# Patient Record
Sex: MALE | Race: OTHER | NOT HISPANIC OR LATINO | URBAN - METROPOLITAN AREA
[De-identification: names, ages, dates, MRNs, and addresses within clinical notes are randomized per-mention and may not be internally consistent; named-entity substitution may affect disease eponyms.]

---

## 2018-03-26 ENCOUNTER — EMERGENCY (EMERGENCY)
Facility: HOSPITAL | Age: 53
LOS: 0 days | Discharge: HOME | End: 2018-03-26
Attending: EMERGENCY MEDICINE
Payer: MEDICAID

## 2018-03-26 VITALS
HEART RATE: 75 BPM | TEMPERATURE: 98 F | RESPIRATION RATE: 17 BRPM | HEIGHT: 68 IN | SYSTOLIC BLOOD PRESSURE: 151 MMHG | OXYGEN SATURATION: 100 % | WEIGHT: 160.06 LBS | DIASTOLIC BLOOD PRESSURE: 79 MMHG

## 2018-03-26 DIAGNOSIS — M54.32 SCIATICA, LEFT SIDE: ICD-10-CM

## 2018-03-26 DIAGNOSIS — M54.5 LOW BACK PAIN: ICD-10-CM

## 2018-03-26 PROCEDURE — 99283 EMERGENCY DEPT VISIT LOW MDM: CPT

## 2018-03-26 RX ORDER — KETOROLAC TROMETHAMINE 30 MG/ML
30 SYRINGE (ML) INJECTION ONCE
Qty: 0 | Refills: 0 | Status: DISCONTINUED | OUTPATIENT
Start: 2018-03-26 | End: 2018-03-26

## 2018-03-26 RX ORDER — DIAZEPAM 5 MG
1 TABLET ORAL
Qty: 9 | Refills: 0 | OUTPATIENT
Start: 2018-03-26 | End: 2018-03-28

## 2018-03-26 RX ORDER — DIAZEPAM 5 MG
5 TABLET ORAL ONCE
Qty: 0 | Refills: 0 | Status: DISCONTINUED | OUTPATIENT
Start: 2018-03-26 | End: 2018-03-26

## 2018-03-26 RX ADMIN — Medication 30 MILLIGRAM(S): at 16:36

## 2018-03-26 RX ADMIN — Medication 50 MILLIGRAM(S): at 16:37

## 2018-03-26 RX ADMIN — Medication 5 MILLIGRAM(S): at 16:37
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
## 2018-03-26 NOTE — ED PROVIDER NOTE - MEDICAL DECISION MAKING DETAILS
history/physical consistent with sciatic pain without red flags. triage note states pain in both legs, pt denies any pain in right leg, repeatedly says only left leg. no trauma, not acute, no sign of spinal cord compression. could be from herniated disc vs muscle spasm. toradol, valium, short course steroid, pcp f/u for outpt mri and/or PT if not improving.

## 2018-03-26 NOTE — ED PROVIDER NOTE - PROGRESS NOTE DETAILS
Bebeto: pt with good improvement of pain. ambulatory, given scripts for prednisone, naprosyn and valium, instructions and precautions. stable for d/c.

## 2018-03-26 NOTE — ED PROVIDER NOTE - PHYSICAL EXAMINATION
Gen: mild discomfort, non toxic, sitting up in chair  HEENT: Mucous membranes moist, pink conjunctivae, EOMI  CV: RRR, nl s1/s2.  Resp: CTAB, normal rate and effort  GI: Abdomen soft, NT, ND. No rebound, no guarding  : No CVAT  Neuro: A&O x 3, moving all 4 extremities. 2+ reflexes b/l knees and ankles.   MSK: no focal midline spinal tenderness, mild generalized tenderness left lower back. no swelling/focal tenderness in LLE.   Skin: No rashes. intact and perfused.
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
## 2018-09-22 ENCOUNTER — INPATIENT (INPATIENT)
Facility: HOSPITAL | Age: 53
LOS: 1 days | Discharge: HOME | End: 2018-09-24
Attending: INTERNAL MEDICINE | Admitting: INTERNAL MEDICINE
Payer: MEDICAID

## 2018-09-22 VITALS
TEMPERATURE: 98 F | SYSTOLIC BLOOD PRESSURE: 136 MMHG | HEART RATE: 81 BPM | RESPIRATION RATE: 16 BRPM | OXYGEN SATURATION: 100 % | DIASTOLIC BLOOD PRESSURE: 91 MMHG

## 2018-09-22 PROBLEM — M54.9 DORSALGIA, UNSPECIFIED: Chronic | Status: ACTIVE | Noted: 2018-03-26

## 2018-09-22 LAB
ALBUMIN SERPL ELPH-MCNC: 4.1 G/DL — SIGNIFICANT CHANGE UP (ref 3.3–5)
ALP SERPL-CCNC: 82 U/L — SIGNIFICANT CHANGE UP (ref 40–120)
ALT FLD-CCNC: 20 U/L — SIGNIFICANT CHANGE UP (ref 4–41)
AST SERPL-CCNC: 23 U/L — SIGNIFICANT CHANGE UP (ref 4–40)
BASOPHILS # BLD AUTO: 0.08 K/UL — SIGNIFICANT CHANGE UP (ref 0–0.2)
BASOPHILS NFR BLD AUTO: 0.4 % — SIGNIFICANT CHANGE UP (ref 0–2)
BILIRUB SERPL-MCNC: 1.9 MG/DL — HIGH (ref 0.2–1.2)
BUN SERPL-MCNC: 18 MG/DL — SIGNIFICANT CHANGE UP (ref 7–23)
CALCIUM SERPL-MCNC: 9.1 MG/DL — SIGNIFICANT CHANGE UP (ref 8.4–10.5)
CHLORIDE SERPL-SCNC: 101 MMOL/L — SIGNIFICANT CHANGE UP (ref 98–107)
CO2 SERPL-SCNC: 23 MMOL/L — SIGNIFICANT CHANGE UP (ref 22–31)
CREAT SERPL-MCNC: 0.97 MG/DL — SIGNIFICANT CHANGE UP (ref 0.5–1.3)
EOSINOPHIL # BLD AUTO: 0.07 K/UL — SIGNIFICANT CHANGE UP (ref 0–0.5)
EOSINOPHIL NFR BLD AUTO: 0.4 % — SIGNIFICANT CHANGE UP (ref 0–6)
GLUCOSE SERPL-MCNC: 91 MG/DL — SIGNIFICANT CHANGE UP (ref 70–99)
HCT VFR BLD CALC: 39.8 % — SIGNIFICANT CHANGE UP (ref 39–50)
HGB BLD-MCNC: 13.5 G/DL — SIGNIFICANT CHANGE UP (ref 13–17)
IMM GRANULOCYTES # BLD AUTO: 0.16 # — SIGNIFICANT CHANGE UP
IMM GRANULOCYTES NFR BLD AUTO: 0.9 % — SIGNIFICANT CHANGE UP (ref 0–1.5)
LYMPHOCYTES # BLD AUTO: 1.8 K/UL — SIGNIFICANT CHANGE UP (ref 1–3.3)
LYMPHOCYTES # BLD AUTO: 10.1 % — LOW (ref 13–44)
MCHC RBC-ENTMCNC: 29.6 PG — SIGNIFICANT CHANGE UP (ref 27–34)
MCHC RBC-ENTMCNC: 33.9 % — SIGNIFICANT CHANGE UP (ref 32–36)
MCV RBC AUTO: 87.3 FL — SIGNIFICANT CHANGE UP (ref 80–100)
MONOCYTES # BLD AUTO: 1.47 K/UL — HIGH (ref 0–0.9)
MONOCYTES NFR BLD AUTO: 8.2 % — SIGNIFICANT CHANGE UP (ref 2–14)
NEUTROPHILS # BLD AUTO: 14.29 K/UL — HIGH (ref 1.8–7.4)
NEUTROPHILS NFR BLD AUTO: 80 % — HIGH (ref 43–77)
NRBC # FLD: 0 — SIGNIFICANT CHANGE UP
PLATELET # BLD AUTO: 202 K/UL — SIGNIFICANT CHANGE UP (ref 150–400)
PMV BLD: 10.7 FL — SIGNIFICANT CHANGE UP (ref 7–13)
POTASSIUM SERPL-MCNC: 3.9 MMOL/L — SIGNIFICANT CHANGE UP (ref 3.5–5.3)
POTASSIUM SERPL-SCNC: 3.9 MMOL/L — SIGNIFICANT CHANGE UP (ref 3.5–5.3)
PROT SERPL-MCNC: 7.1 G/DL — SIGNIFICANT CHANGE UP (ref 6–8.3)
RBC # BLD: 4.56 M/UL — SIGNIFICANT CHANGE UP (ref 4.2–5.8)
RBC # FLD: 12 % — SIGNIFICANT CHANGE UP (ref 10.3–14.5)
SODIUM SERPL-SCNC: 138 MMOL/L — SIGNIFICANT CHANGE UP (ref 135–145)
WBC # BLD: 17.87 K/UL — HIGH (ref 3.8–10.5)
WBC # FLD AUTO: 17.87 K/UL — HIGH (ref 3.8–10.5)

## 2018-09-22 PROCEDURE — 93971 EXTREMITY STUDY: CPT | Mod: 26,LT

## 2018-09-22 RX ORDER — KETOROLAC TROMETHAMINE 30 MG/ML
30 SYRINGE (ML) INJECTION ONCE
Qty: 0 | Refills: 0 | Status: DISCONTINUED | OUTPATIENT
Start: 2018-09-22 | End: 2018-09-22

## 2018-09-22 RX ORDER — OXYCODONE AND ACETAMINOPHEN 5; 325 MG/1; MG/1
1 TABLET ORAL ONCE
Qty: 0 | Refills: 0 | Status: DISCONTINUED | OUTPATIENT
Start: 2018-09-22 | End: 2018-09-22

## 2018-09-22 RX ORDER — SODIUM CHLORIDE 9 MG/ML
1000 INJECTION INTRAMUSCULAR; INTRAVENOUS; SUBCUTANEOUS ONCE
Qty: 0 | Refills: 0 | Status: COMPLETED | OUTPATIENT
Start: 2018-09-22 | End: 2018-09-22

## 2018-09-22 RX ADMIN — OXYCODONE AND ACETAMINOPHEN 1 TABLET(S): 5; 325 TABLET ORAL at 21:25

## 2018-09-22 RX ADMIN — SODIUM CHLORIDE 1000 MILLILITER(S): 9 INJECTION INTRAMUSCULAR; INTRAVENOUS; SUBCUTANEOUS at 22:45

## 2018-09-22 RX ADMIN — OXYCODONE AND ACETAMINOPHEN 1 TABLET(S): 5; 325 TABLET ORAL at 22:00

## 2018-09-22 RX ADMIN — Medication 30 MILLIGRAM(S): at 17:03

## 2018-09-22 RX ADMIN — OXYCODONE AND ACETAMINOPHEN 1 TABLET(S): 5; 325 TABLET ORAL at 23:20

## 2018-09-22 RX ADMIN — SODIUM CHLORIDE 1000 MILLILITER(S): 9 INJECTION INTRAMUSCULAR; INTRAVENOUS; SUBCUTANEOUS at 23:55

## 2018-09-22 RX ADMIN — Medication 100 MILLIGRAM(S): at 17:02

## 2018-09-22 RX ADMIN — OXYCODONE AND ACETAMINOPHEN 1 TABLET(S): 5; 325 TABLET ORAL at 23:55

## 2018-09-22 NOTE — ED PROVIDER NOTE - OBJECTIVE STATEMENT
52 y/o male pmh eczema c/o RLE pain ,redness and swelling x4 days. Pt admits to noticing small area of redness around old skin irritation. Pt states that the area has become increasingly painful and more red. Pt now c/o pain radiating up to R groin. Pt admits to subjective fever at home. Denies chest pain, sob, abd pain, n/b/d, numbness, tingling, weakness, dizziness, syncope or chills. Pt denies recent long travel or immobilization.

## 2018-09-22 NOTE — ED CDU PROVIDER INITIAL DAY NOTE - SEPSIS ALERT QUESTION 1
This patient was evaluated for sepsis.  At this time, a diagnosis of sepsis is not supported by the overall clinical picture.

## 2018-09-22 NOTE — ED CDU PROVIDER INITIAL DAY NOTE - OBJECTIVE STATEMENT
54 y/o male pmh eczema c/o RLE pain ,redness and swelling x4 days. Pt admits to noticing small area of redness around old skin irritation. Pt states that the area has become increasingly painful and more red. Pt now c/o pain radiating up to R groin. Pt admits to subjective fever at home. Denies chest pain, sob, abd pain, n/b/d, numbness, tingling, weakness, dizziness, syncope or chills. Pt denies recent long travel or immobilization.    CDU TIEN Marie: 54 yo M with PMHX of eczema presented to ED with RLE x 4 days, worsening in pain prompting is ED visit. Tactile/ subjective fever at home (afebrile in ED). Pain radiates up leg to the groin. Denies cp ,sob, numbness, tingling, weakness, abd pain, nausea, vomiting, urinary complaints. Pt placed in CDU for IV abx, and general observation for cellulitis.

## 2018-09-22 NOTE — ED ADULT TRIAGE NOTE - CHIEF COMPLAINT QUOTE
Pt c/o redness/swelling/pain to shin x 3 days, area warm to touch, denies trauma to area/recent long travel.

## 2018-09-22 NOTE — ED CDU PROVIDER INITIAL DAY NOTE - ATTENDING CONTRIBUTION TO CARE
I performed a face to face bedside interview with patient regarding history of present illness, review of symptoms and past medical history. I completed an independent physical exam.  I have discussed patient's plan of care.   I agree with note as stated above, having amended the EMR as needed to reflect my findings. I have discussed the assessment and plan of care.  This includes during the time I functioned as the attending physician for this patient.  Attending Contribution to Care: agree with plan of pa. pt p/w iv abx s/p cellulitis around eczema site. pt hd stable. labs are wnl. stable for observation

## 2018-09-22 NOTE — ED PROVIDER NOTE - PHYSICAL EXAMINATION
RLE- 2 areas with superficial abrasions w/ surrounding erythema, TTP and minimal fluctuance. + proximal streaking. + calf tenderness, no pitting edema. 2 + pulses, distal sensation intact

## 2018-09-22 NOTE — ED CDU PROVIDER INITIAL DAY NOTE - LOCATION
anterior right shin with 2 areas of superficial abrasions with surrounding erythema, and TTP. mild fluctuance to superior area.NVI, 5/5 strength./leg

## 2018-09-22 NOTE — ED PROVIDER NOTE - MEDICAL DECISION MAKING DETAILS
54 y/o male w/ RLE pain, redness and swelling- likely cellulitis but will check doppler, labs, abx, cdu

## 2018-09-22 NOTE — ED ADULT NURSE NOTE - OBJECTIVE STATEMENT
Patient presented to ED A&O x4, with PMH: eczema c/o RLE pain ,redness and swelling x 4 days. Blood work drawn and sent to lab. ER physician evaluated patient, pain and IV antibiotics ordered and administered .  Will continue to monitor patient closely. Andrea BACK

## 2018-09-23 ENCOUNTER — TRANSCRIPTION ENCOUNTER (OUTPATIENT)
Age: 53
End: 2018-09-23

## 2018-09-23 DIAGNOSIS — Z29.9 ENCOUNTER FOR PROPHYLACTIC MEASURES, UNSPECIFIED: ICD-10-CM

## 2018-09-23 DIAGNOSIS — L03.90 CELLULITIS, UNSPECIFIED: ICD-10-CM

## 2018-09-23 DIAGNOSIS — R63.8 OTHER SYMPTOMS AND SIGNS CONCERNING FOOD AND FLUID INTAKE: ICD-10-CM

## 2018-09-23 DIAGNOSIS — M54.9 DORSALGIA, UNSPECIFIED: ICD-10-CM

## 2018-09-23 DIAGNOSIS — K46.9 UNSPECIFIED ABDOMINAL HERNIA WITHOUT OBSTRUCTION OR GANGRENE: Chronic | ICD-10-CM

## 2018-09-23 LAB
BASOPHILS # BLD AUTO: 0.09 K/UL — SIGNIFICANT CHANGE UP (ref 0–0.2)
BASOPHILS NFR BLD AUTO: 0.5 % — SIGNIFICANT CHANGE UP (ref 0–2)
EOSINOPHIL # BLD AUTO: 0.29 K/UL — SIGNIFICANT CHANGE UP (ref 0–0.5)
EOSINOPHIL NFR BLD AUTO: 1.7 % — SIGNIFICANT CHANGE UP (ref 0–6)
HBA1C BLD-MCNC: 5.7 % — HIGH (ref 4–5.6)
HCT VFR BLD CALC: 38 % — LOW (ref 39–50)
HGB BLD-MCNC: 12.8 G/DL — LOW (ref 13–17)
IMM GRANULOCYTES # BLD AUTO: 0.08 # — SIGNIFICANT CHANGE UP
IMM GRANULOCYTES NFR BLD AUTO: 0.5 % — SIGNIFICANT CHANGE UP (ref 0–1.5)
LYMPHOCYTES # BLD AUTO: 1.98 K/UL — SIGNIFICANT CHANGE UP (ref 1–3.3)
LYMPHOCYTES # BLD AUTO: 11.7 % — LOW (ref 13–44)
MCHC RBC-ENTMCNC: 29.6 PG — SIGNIFICANT CHANGE UP (ref 27–34)
MCHC RBC-ENTMCNC: 33.7 % — SIGNIFICANT CHANGE UP (ref 32–36)
MCV RBC AUTO: 88 FL — SIGNIFICANT CHANGE UP (ref 80–100)
MONOCYTES # BLD AUTO: 1.13 K/UL — HIGH (ref 0–0.9)
MONOCYTES NFR BLD AUTO: 6.7 % — SIGNIFICANT CHANGE UP (ref 2–14)
NEUTROPHILS # BLD AUTO: 13.31 K/UL — HIGH (ref 1.8–7.4)
NEUTROPHILS NFR BLD AUTO: 78.9 % — HIGH (ref 43–77)
NRBC # FLD: 0 — SIGNIFICANT CHANGE UP
PLATELET # BLD AUTO: 196 K/UL — SIGNIFICANT CHANGE UP (ref 150–400)
PMV BLD: 10.6 FL — SIGNIFICANT CHANGE UP (ref 7–13)
RBC # BLD: 4.32 M/UL — SIGNIFICANT CHANGE UP (ref 4.2–5.8)
RBC # FLD: 12.3 % — SIGNIFICANT CHANGE UP (ref 10.3–14.5)
WBC # BLD: 16.88 K/UL — HIGH (ref 3.8–10.5)
WBC # FLD AUTO: 16.88 K/UL — HIGH (ref 3.8–10.5)

## 2018-09-23 PROCEDURE — 73590 X-RAY EXAM OF LOWER LEG: CPT | Mod: 26,RT

## 2018-09-23 PROCEDURE — 99223 1ST HOSP IP/OBS HIGH 75: CPT | Mod: GC

## 2018-09-23 RX ORDER — INFLUENZA VIRUS VACCINE 15; 15; 15; 15 UG/.5ML; UG/.5ML; UG/.5ML; UG/.5ML
0.5 SUSPENSION INTRAMUSCULAR ONCE
Qty: 0 | Refills: 0 | Status: COMPLETED | OUTPATIENT
Start: 2018-09-23 | End: 2018-09-24

## 2018-09-23 RX ORDER — PIPERACILLIN AND TAZOBACTAM 4; .5 G/20ML; G/20ML
3.38 INJECTION, POWDER, LYOPHILIZED, FOR SOLUTION INTRAVENOUS EVERY 8 HOURS
Qty: 0 | Refills: 0 | Status: DISCONTINUED | OUTPATIENT
Start: 2018-09-23 | End: 2018-09-23

## 2018-09-23 RX ORDER — DICLOFENAC SODIUM 75 MG/1
0 TABLET, DELAYED RELEASE ORAL
Qty: 0 | Refills: 0 | COMMUNITY

## 2018-09-23 RX ORDER — VANCOMYCIN HCL 1 G
1000 VIAL (EA) INTRAVENOUS EVERY 12 HOURS
Qty: 0 | Refills: 0 | Status: DISCONTINUED | OUTPATIENT
Start: 2018-09-23 | End: 2018-09-24

## 2018-09-23 RX ORDER — DIPHENHYDRAMINE HCL 50 MG
25 CAPSULE ORAL ONCE
Qty: 0 | Refills: 0 | Status: COMPLETED | OUTPATIENT
Start: 2018-09-23 | End: 2018-09-23

## 2018-09-23 RX ORDER — PIPERACILLIN AND TAZOBACTAM 4; .5 G/20ML; G/20ML
3.38 INJECTION, POWDER, LYOPHILIZED, FOR SOLUTION INTRAVENOUS ONCE
Qty: 0 | Refills: 0 | Status: COMPLETED | OUTPATIENT
Start: 2018-09-23 | End: 2018-09-23

## 2018-09-23 RX ORDER — ACETAMINOPHEN 500 MG
650 TABLET ORAL EVERY 6 HOURS
Qty: 0 | Refills: 0 | Status: DISCONTINUED | OUTPATIENT
Start: 2018-09-23 | End: 2018-09-24

## 2018-09-23 RX ORDER — KETOROLAC TROMETHAMINE 30 MG/ML
15 SYRINGE (ML) INJECTION ONCE
Qty: 0 | Refills: 0 | Status: DISCONTINUED | OUTPATIENT
Start: 2018-09-23 | End: 2018-09-23

## 2018-09-23 RX ORDER — VANCOMYCIN HCL 1 G
1000 VIAL (EA) INTRAVENOUS ONCE
Qty: 0 | Refills: 0 | Status: COMPLETED | OUTPATIENT
Start: 2018-09-23 | End: 2018-09-23

## 2018-09-23 RX ORDER — OXYCODONE AND ACETAMINOPHEN 5; 325 MG/1; MG/1
1 TABLET ORAL EVERY 4 HOURS
Qty: 0 | Refills: 0 | Status: DISCONTINUED | OUTPATIENT
Start: 2018-09-23 | End: 2018-09-24

## 2018-09-23 RX ORDER — CYCLOBENZAPRINE HYDROCHLORIDE 10 MG/1
0 TABLET, FILM COATED ORAL
Qty: 0 | Refills: 0 | COMMUNITY

## 2018-09-23 RX ORDER — FAMOTIDINE 10 MG/ML
20 INJECTION INTRAVENOUS DAILY
Qty: 0 | Refills: 0 | Status: DISCONTINUED | OUTPATIENT
Start: 2018-09-23 | End: 2018-09-24

## 2018-09-23 RX ORDER — MORPHINE SULFATE 50 MG/1
4 CAPSULE, EXTENDED RELEASE ORAL ONCE
Qty: 0 | Refills: 0 | Status: DISCONTINUED | OUTPATIENT
Start: 2018-09-23 | End: 2018-09-23

## 2018-09-23 RX ADMIN — Medication 250 MILLIGRAM(S): at 22:08

## 2018-09-23 RX ADMIN — Medication 650 MILLIGRAM(S): at 21:35

## 2018-09-23 RX ADMIN — Medication 250 MILLIGRAM(S): at 10:30

## 2018-09-23 RX ADMIN — Medication 600 MILLIGRAM(S): at 10:15

## 2018-09-23 RX ADMIN — OXYCODONE AND ACETAMINOPHEN 1 TABLET(S): 5; 325 TABLET ORAL at 18:15

## 2018-09-23 RX ADMIN — OXYCODONE AND ACETAMINOPHEN 1 TABLET(S): 5; 325 TABLET ORAL at 22:15

## 2018-09-23 RX ADMIN — Medication 100 MILLIGRAM(S): at 09:22

## 2018-09-23 RX ADMIN — Medication 15 MILLIGRAM(S): at 10:00

## 2018-09-23 RX ADMIN — Medication 100 MILLIGRAM(S): at 01:29

## 2018-09-23 RX ADMIN — OXYCODONE AND ACETAMINOPHEN 1 TABLET(S): 5; 325 TABLET ORAL at 23:15

## 2018-09-23 RX ADMIN — Medication 15 MILLIGRAM(S): at 10:15

## 2018-09-23 RX ADMIN — PIPERACILLIN AND TAZOBACTAM 200 GRAM(S): 4; .5 INJECTION, POWDER, LYOPHILIZED, FOR SOLUTION INTRAVENOUS at 14:30

## 2018-09-23 RX ADMIN — MORPHINE SULFATE 4 MILLIGRAM(S): 50 CAPSULE, EXTENDED RELEASE ORAL at 11:00

## 2018-09-23 RX ADMIN — Medication 650 MILLIGRAM(S): at 20:35

## 2018-09-23 RX ADMIN — Medication 25 MILLIGRAM(S): at 10:37

## 2018-09-23 RX ADMIN — OXYCODONE AND ACETAMINOPHEN 1 TABLET(S): 5; 325 TABLET ORAL at 19:04

## 2018-09-23 RX ADMIN — Medication 600 MILLIGRAM(S): at 02:00

## 2018-09-23 RX ADMIN — MORPHINE SULFATE 4 MILLIGRAM(S): 50 CAPSULE, EXTENDED RELEASE ORAL at 10:44

## 2018-09-23 RX ADMIN — Medication 1000 MILLIGRAM(S): at 12:19

## 2018-09-23 NOTE — H&P ADULT - ATTENDING COMMENTS
Agree with Housestaff Note Above, edits made where appropriate, case discussed with housestaff    Patient seen and examined. This is a 53M being admitted for cellulitis s/p I&D of fluctuant mass of R anterior shin. Patient with less radiating pain now but still uncomfortable. No cough, diarrhea, neck/back stiffness or dysuria.   VS and PE as above  Labs and Imaging Reviewed  Agree with SOAP note above, c/w Vancomycin check BCx, Wound Care Consult, Pain control

## 2018-09-23 NOTE — H&P ADULT - FAMILY HISTORY
No pertinent family history in first degree relatives Sibling  Still living? Unknown  Family history of eczema, Age at diagnosis: Age Unknown  Family history of diabetes mellitus, Age at diagnosis: Age Unknown  Family history of stroke, Age at diagnosis: Age Unknown     Father  Still living? No  Family history of asthma, Age at diagnosis: Age Unknown

## 2018-09-23 NOTE — ED CDU PROVIDER DISPOSITION NOTE - CLINICAL COURSE
Jyothi MOISE- 54 Y/O M with h/o eczema p/w rt leg swelling and redness focal around area of eczematous lesions. No fever, chills. Pt has pulsating pain in rt lower leg at area of redness and squeezed some pus earlier by himself    pt has spreading redness circumferentially, admitted for spreading cellulitis, bedside drainage showed no abscess or drainage

## 2018-09-23 NOTE — H&P ADULT - PROBLEM SELECTOR PLAN 1
Pt had drainage, s/p incision and drainage; X-ray negative for radiographic signs of osteomyelitis, duplex study negative for DVT  - f/u wound cx  - f/u bcx x 2  - would c/w Vanc 1 g QD  - f/u wound care  - tylenol PRN for pain/fever  - would hold off on MRI to r/o osteomyelitis at this time Pt had drainage, s/p incision and drainage; X-ray negative for radiographic signs of osteomyelitis, duplex study negative for DVT  - f/u wound cx  - f/u bcx x 2  - would c/w Vanc 1 g QD  - f/u wound care  - tylenol PRN for pain/fever, PRN percocet for severe pain  - would hold off on MRI to r/o osteomyelitis at this time

## 2018-09-23 NOTE — DISCHARGE NOTE ADULT - HOSPITAL COURSE
53 M w/ history of eczema c/o RLE pain, redness and swelling x5 days admitted for cellulitis.   In the ED pt was afebrile, normotensive and not tachycardic. He was given Vanc and zosyn in the ED as well as benadryl, Toradol and morphine. The ED performed I&D of the RLE due to fluctuance. On admission vanc was continued due to cellulitis. 53 M w/ history of eczema c/o RLE pain, redness and swelling x5 days admitted for cellulitis.   In the ED pt was afebrile, normotensive and not tachycardic. He was given Vanc and zosyn in the ED as well as benadryl, Toradol and morphine. The ED performed I&D of the RLE due to fluctuance. On admission vanc was continued due to cellulitis. Pt's fevers/chills resolved and RLE pan improved so he was transitioned to oral Abx and discharged    Pt is currently stable and ready for discharge to home with PCP f/u within 1 week 53 M w/ history of eczema c/o RLE pain, redness and swelling x5 days admitted for cellulitis. In the ED pt was afebrile, normotensive and not tachycardic. He was given Vanc and zosyn in the ED as well as benadryl, Toradol and morphine. The ED performed I&D of the RLE due to fluctuance; doppler negative for DVT and X-ray negative for radiographic findings of osteomyelitis. Pt's fevers/chills resolved and RLE pan improved so he was transitioned to Bactrim to complete a 10 day course as an outpatient.

## 2018-09-23 NOTE — DISCHARGE NOTE ADULT - MEDICATION SUMMARY - MEDICATIONS TO TAKE
I will START or STAY ON the medications listed below when I get home from the hospital:    famotidine 20 mg oral tablet  -- 1 tab(s) by mouth once a day, As needed, reflux  -- Indication: For GERD    SMZ-TMP  mg-160 mg oral tablet  -- 1 tab(s) by mouth 2 times a day   -- Avoid prolonged or excessive exposure to direct and/or artificial sunlight while taking this medication.  Finish all this medication unless otherwise directed by prescriber.  Medication should be taken with plenty of water.    -- Indication: For Cellulitis of right lower extremity

## 2018-09-23 NOTE — H&P ADULT - NSHPPHYSICALEXAM_GEN_ALL_CORE
Vital Signs Last 24 Hrs  T(C): 36.6 (09-23-18 @ 09:43), Max: 37 (09-22-18 @ 22:00)  T(F): 97.8 (09-23-18 @ 09:43), Max: 98.6 (09-22-18 @ 22:00)  HR: 85 (09-23-18 @ 10:43) (79 - 93)  BP: 130/66 (09-23-18 @ 10:43) (115/66 - 136/91)  RR: 17 (09-23-18 @ 09:43) (16 - 20)  SpO2: 98% (09-23-18 @ 09:43) (98% - 100%)    PHYSICAL EXAM:  GENERAL: NAD, well-groomed, well-developed  HEAD:  Atraumatic, Normocephalic  EYES: EOMI, PERRLA, conjunctiva and sclera clear  ENMT: No tonsillar erythema, exudates, or enlargement; Moist mucous membranes, Good dentition, No lesions  NECK: Supple, No JVD, Normal thyroid  CHEST/LUNG: Clear to percussion bilaterally; No rales, rhonchi, wheezing, or rubs  HEART: Regular rate and rhythm; No murmurs, rubs, or gallops  ABDOMEN: Soft, Nontender, Nondistended; Bowel sounds present  EXTREMITIES:  2+ Peripheral Pulses, No clubbing, cyanosis, or edema  LYMPH: No lymphadenopathy noted  SKIN: No rashes or lesions  NERVOUS SYSTEM:  Alert & Oriented X3, Good concentration; Motor Strength 5/5 B/L upper and lower extremities; DTRs 2+ intact and symmetric Vital Signs Last 24 Hrs  T(C): 36.6 (09-23-18 @ 09:43), Max: 37 (09-22-18 @ 22:00)  T(F): 97.8 (09-23-18 @ 09:43), Max: 98.6 (09-22-18 @ 22:00)  HR: 85 (09-23-18 @ 10:43) (79 - 93)  BP: 130/66 (09-23-18 @ 10:43) (115/66 - 136/91)  RR: 17 (09-23-18 @ 09:43) (16 - 20)  SpO2: 98% (09-23-18 @ 09:43) (98% - 100%)    PHYSICAL EXAM:  GENERAL: NAD, well-groomed, well-developed, resting in bed with mild discomfort  HEAD:  Atraumatic, Normocephalic  EYES: EOMI, PERRLA, conjunctiva and sclera clear  ENMT: No tonsillar erythema, exudates, or enlargement; Moist mucous membranes, Good dentition, No lesions  NECK: Supple, No JVD, Normal thyroid  CHEST/LUNG: Clear to percussion bilaterally; No rales, rhonchi, wheezing, or rubs  HEART: Regular rate and rhythm; No murmurs, rubs, or gallops  ABDOMEN: Soft, Nontender, Nondistended; Bowel sounds present  EXTREMITIES:  2+ Peripheral Pulses, No clubbing, cyanosis, or edema  LYMPH: No lymphadenopathy noted  SKIN: R shin erythema and warmth ~3cm surround site of I&D. chronic hyperpigmented patches with overlying scale diffusely over LE.   NERVOUS SYSTEM:  Alert & Oriented X3, Good concentration; +R strait leg test for shooting pain. R LE strength 4/5 limited by pain. L LE 5/5

## 2018-09-23 NOTE — DISCHARGE NOTE ADULT - ADDITIONAL INSTRUCTIONS
Please followup with your primary care doctor within one week Please followup with your primary care doctor within one week.  Please take your medication as prescribed.

## 2018-09-23 NOTE — DISCHARGE NOTE ADULT - CARE PLAN
Principal Discharge DX:	Cellulitis of right lower extremity  Goal:	Followup with primary care doctor  Assessment and plan of treatment:	You were admitted to the hospital with abscess of the right lower leg and surrounding skin infection. The abscess was drained in the emergency room and you were treated with antibiotics. Please take your antibiotics as prescribed and followup with your primary care doctor within one week Principal Discharge DX:	Cellulitis of right lower extremity  Goal:	Followup with primary care doctor  Assessment and plan of treatment:	You were admitted to the hospital with abscess of the right lower leg and surrounding skin infection. The abscess was drained in the emergency room and you were treated with antibiotics. Please take your antibiotics as prescribed and followup with your primary care doctor within one week.  Please gently wash your leg daily and replace the dressing with clean gauze daily until healed Principal Discharge DX:	Cellulitis of right lower extremity  Goal:	Followup with primary care doctor  Assessment and plan of treatment:	You were admitted to the hospital with abscess of the right lower leg and surrounding skin infection. The abscess was drained in the emergency room and you were treated with antibiotics. Please take your antibiotics as prescribed and followup with your primary care doctor within one week.  Please shower or bathe daily allowing soap and water to wash over the affected area. The wound may drain for the first 2 days. Cover the wound with a clean dry gauze dressing. Change the dressing if it becomes soaked with blood or pus. You may use acetaminophen or ibuprofen to control pain.

## 2018-09-23 NOTE — ED CDU PROVIDER SUBSEQUENT DAY NOTE - HISTORY
TIEN Hale: 54 y/o M hx eczema (gets light therapy daily) here w/ worsening R shin pain x4 days. Was put in the CDU for IV abx. This morning feels worsening pain, throbbing in quality. Area was attempted to be drained however no pus drained. Bedside US done by myself, no collection identified, +cobblestoning c/w cellulitis. Pt to be admitted today for worsening cellulitis, Vanc/Zosyn initiated.

## 2018-09-23 NOTE — H&P ADULT - NSHPSOCIALHISTORY_GEN_ALL_CORE
Marital Status:  (   )    (   ) Single    (   )    (  )   Occupation:   Lives with: (  ) alone  (  ) children   (  ) spouse   (  ) parents  (  ) other  Substance Use (street drugs): (  ) never used  (  ) other:  Tobacco Usage:  (   ) never smoked   (   ) former smoker   (   ) current smoker  (     ) pack year  (        ) last cigarette date  Alcohol Usage:  Sexual History: Marital Status:        Occupation:    Lives with: wife and children  Substance Use (street drugs): denies  Tobacco Usage:  denies  Alcohol Usage: denies

## 2018-09-23 NOTE — DISCHARGE NOTE ADULT - PATIENT PORTAL LINK FT
You can access the [facility] Patient Portal, offered by [facility], by registering with the following website: [email or web address]
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
## 2018-09-23 NOTE — H&P ADULT - NSHPLABSRESULTS_GEN_ALL_CORE
09-22    138  |  101  |  18  ----------------------------<  91  3.9   |  23  |  0.97    Ca    9.1      22 Sep 2018 16:50    TPro  7.1  /  Alb  4.1  /  TBili  1.9<H>  /  DBili  x   /  AST  23  /  ALT  20  /  AlkPhos  82  09-22                        12.8   16.88 )-----------( 196      ( 23 Sep 2018 05:10 )             38.0                         13.5   17.87 )-----------( 202      ( 22 Sep 2018 16:50 )             39.8     CAPILLARY BLOOD GLUCOSE

## 2018-09-23 NOTE — DISCHARGE NOTE ADULT - CARE PROVIDER_API CALL
Andrew Worley  [address] [address] [place] [address]  Phone: [phone]  Fax: (   )    -
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
## 2018-09-23 NOTE — DISCHARGE NOTE ADULT - PLAN OF CARE
Followup with primary care doctor You were admitted to the hospital with abscess of the right lower leg and surrounding skin infection. The abscess was drained in the emergency room and you were treated with antibiotics. Please take your antibiotics as prescribed and followup with your primary care doctor within one week You were admitted to the hospital with abscess of the right lower leg and surrounding skin infection. The abscess was drained in the emergency room and you were treated with antibiotics. Please take your antibiotics as prescribed and followup with your primary care doctor within one week.  Please gently wash your leg daily and replace the dressing with clean gauze daily until healed You were admitted to the hospital with abscess of the right lower leg and surrounding skin infection. The abscess was drained in the emergency room and you were treated with antibiotics. Please take your antibiotics as prescribed and followup with your primary care doctor within one week.  Please shower or bathe daily allowing soap and water to wash over the affected area. The wound may drain for the first 2 days. Cover the wound with a clean dry gauze dressing. Change the dressing if it becomes soaked with blood or pus. You may use acetaminophen or ibuprofen to control pain.

## 2018-09-23 NOTE — DISCHARGE NOTE ADULT - PROVIDER TOKENS
FREE:[LAST:[Brunilda],FIRST:[Andrew],PHONE:[phone]],FAX:[(   )    -],ADDRESS:[[address] [place] [address]]]

## 2018-09-23 NOTE — H&P ADULT - ASSESSMENT
53 M w/ eczema p/w w/ RLE erythema, swelling and drainage likely 2/2 cellulitis, s/p I&D, LE duplex negative for DVT

## 2018-09-23 NOTE — H&P ADULT - PROBLEM SELECTOR PLAN 4
DVT: none needed, IMPROVE score of 0  Dispo: floor  Consult: wound care  Code: full Lip Wedge Excision Repair Text: Given the location of the defect and the proximity to free margins a full thickness wedge repair was deemed most appropriate.  Using a sterile surgical marker, the appropriate repair was drawn incorporating the defect and placing the expected incisions perpendicular to the vermilion border.  The vermilion border was also meticulously outlined to ensure appropriate reapproximation during the repair.  The area thus outlined was incised through and through with a #15 scalpel blade.  The muscularis and dermis were reaproximated with deep sutures following hemostasis. Care was taken to realign the vermilion border before proceeding with the superficial closure.  Once the vermilion was realigned the superfical and mucosal closure was finished.

## 2018-09-23 NOTE — DISCHARGE NOTE ADULT - MEDICATION SUMMARY - MEDICATIONS TO STOP TAKING
I will STOP taking the medications listed below when I get home from the hospital:    Naprosyn    diclofenac    Flexeril    Naprosyn 500 mg oral tablet  -- 1 tab(s) by mouth 2 times a day, As Needed -for moderate pain   -- Check with your doctor before becoming pregnant.  May cause drowsiness or dizziness.  Obtain medical advice before taking any non-prescription drugs as some may affect the action of this medication.  Take with food or milk.    predniSONE 50 mg oral tablet  -- 1 tab(s) by mouth once a day   -- It is very important that you take or use this exactly as directed.  Do not skip doses or discontinue unless directed by your doctor.  Obtain medical advice before taking any non-prescription drugs as some may affect the action of this medication.  Take with food or milk.    Valium 5 mg oral tablet  -- 1 tab(s) by mouth 3 times a day x 3 days, As Needed -for muscle spasm MDD:3  -- Caution federal law prohibits the transfer of this drug to any person other  than the person for whom it was prescribed.  Do not take this drug if you are pregnant.  May cause drowsiness.  Alcohol may intensify this effect.  Use care when operating dangerous machinery.    Aleve 220 mg oral tablet  -- 1 tab(s) by mouth every 8 hours, As Needed    Tylenol 325 mg oral tablet  -- 2 tab(s) by mouth every 4 hours, As Needed    omeprazole 10 mg oral delayed release capsule  -- 1 cap(s) by mouth once a day, As Needed    Fish Oil oral capsule  -- 1 cap(s) by mouth once a day    Centrum oral tablet  -- 1 tab(s) by mouth once a day    Vitamin C 1000 mg oral tablet, chewable  -- 1 tab(s) by mouth once a day    tiZANidine 2 mg oral tablet  -- 1 tab(s) by mouth once a day (at bedtime), As Needed    acetaminophen 325 mg oral tablet  -- 2 tab(s) by mouth every 6 hours, As needed, Temp greater or equal to 38C (100.4F), Mild Pain (1 - 3), Moderate Pain (4 - 6)

## 2018-09-23 NOTE — ED CDU PROVIDER SUBSEQUENT DAY NOTE - ATTENDING CONTRIBUTION TO CARE
Jyothi MOISE- 52 Y/O M with h/o eczema p/w rt leg swelling and redness focal around area of eczematous lesions. No fever, chills. Pt has pulsating pain in rt lower leg at area of redness and squeezed some pus earlier by himself    pt is alert, well appearing male, s1s2 normal reg, b/l clear breath sounds, abd soft, nt, nd, normal bowel sounds, neuro exam aox3, cn 2-12 intact, no focal deficits, rt lower leg proximal aspect redness and erythema with induration , no fluctuance, 3 cm x 4 cm induration with multiple diffuse placoid eczematous lesions on both legs    will apply warm compresses , continue iv antibiotics, will assess for needle drianage if pain not better with warm compress

## 2018-09-23 NOTE — ED CDU PROVIDER DISPOSITION NOTE - ATTENDING CONTRIBUTION TO CARE
Jyothi MOISE- 54 Y/O M with h/o eczema p/w rt leg swelling and redness focal around area of eczematous lesions. No fever, chills. Pt has pulsating pain in rt lower leg at area of redness and squeezed some pus earlier by himself    pt is alert, well appearing male, s1s2 normal reg, b/l clear breath sounds, abd soft, nt, nd, normal bowel sounds, neuro exam aox3, cn 2-12 intact, no focal deficits, rt lower leg proximal aspect redness and erythema with induration , no fluctuance, 3 cm x 4 cm induration with multiple diffuse placoid eczematous lesions on both legs

## 2018-09-23 NOTE — ED CDU PROVIDER SUBSEQUENT DAY NOTE - PHYSICAL EXAMINATION
RLE: diffuse erythema from the midshin overlying an eczematous plaque extending to the proximal tibia, warm and tender to touch, no red streaks, no calf pain tenderness

## 2018-09-24 VITALS
OXYGEN SATURATION: 96 % | TEMPERATURE: 99 F | SYSTOLIC BLOOD PRESSURE: 98 MMHG | DIASTOLIC BLOOD PRESSURE: 55 MMHG | HEART RATE: 60 BPM | RESPIRATION RATE: 18 BRPM

## 2018-09-24 LAB
ALBUMIN SERPL ELPH-MCNC: 3.8 G/DL — SIGNIFICANT CHANGE UP (ref 3.3–5)
ALP SERPL-CCNC: 86 U/L — SIGNIFICANT CHANGE UP (ref 40–120)
ALT FLD-CCNC: 16 U/L — SIGNIFICANT CHANGE UP (ref 4–41)
AST SERPL-CCNC: 16 U/L — SIGNIFICANT CHANGE UP (ref 4–40)
BASOPHILS # BLD AUTO: 0.08 K/UL — SIGNIFICANT CHANGE UP (ref 0–0.2)
BASOPHILS NFR BLD AUTO: 0.5 % — SIGNIFICANT CHANGE UP (ref 0–2)
BILIRUB SERPL-MCNC: 1.5 MG/DL — HIGH (ref 0.2–1.2)
BUN SERPL-MCNC: 12 MG/DL — SIGNIFICANT CHANGE UP (ref 7–23)
CALCIUM SERPL-MCNC: 9.2 MG/DL — SIGNIFICANT CHANGE UP (ref 8.4–10.5)
CHLORIDE SERPL-SCNC: 99 MMOL/L — SIGNIFICANT CHANGE UP (ref 98–107)
CO2 SERPL-SCNC: 22 MMOL/L — SIGNIFICANT CHANGE UP (ref 22–31)
CREAT SERPL-MCNC: 0.89 MG/DL — SIGNIFICANT CHANGE UP (ref 0.5–1.3)
EOSINOPHIL # BLD AUTO: 0.4 K/UL — SIGNIFICANT CHANGE UP (ref 0–0.5)
EOSINOPHIL NFR BLD AUTO: 2.6 % — SIGNIFICANT CHANGE UP (ref 0–6)
GLUCOSE SERPL-MCNC: 90 MG/DL — SIGNIFICANT CHANGE UP (ref 70–99)
HCT VFR BLD CALC: 43.4 % — SIGNIFICANT CHANGE UP (ref 39–50)
HGB BLD-MCNC: 14.5 G/DL — SIGNIFICANT CHANGE UP (ref 13–17)
IMM GRANULOCYTES # BLD AUTO: 0.09 # — SIGNIFICANT CHANGE UP
IMM GRANULOCYTES NFR BLD AUTO: 0.6 % — SIGNIFICANT CHANGE UP (ref 0–1.5)
LYMPHOCYTES # BLD AUTO: 1.89 K/UL — SIGNIFICANT CHANGE UP (ref 1–3.3)
LYMPHOCYTES # BLD AUTO: 12.2 % — LOW (ref 13–44)
MAGNESIUM SERPL-MCNC: 2 MG/DL — SIGNIFICANT CHANGE UP (ref 1.6–2.6)
MCHC RBC-ENTMCNC: 29.7 PG — SIGNIFICANT CHANGE UP (ref 27–34)
MCHC RBC-ENTMCNC: 33.4 % — SIGNIFICANT CHANGE UP (ref 32–36)
MCV RBC AUTO: 88.9 FL — SIGNIFICANT CHANGE UP (ref 80–100)
MONOCYTES # BLD AUTO: 1.13 K/UL — HIGH (ref 0–0.9)
MONOCYTES NFR BLD AUTO: 7.3 % — SIGNIFICANT CHANGE UP (ref 2–14)
NEUTROPHILS # BLD AUTO: 11.9 K/UL — HIGH (ref 1.8–7.4)
NEUTROPHILS NFR BLD AUTO: 76.8 % — SIGNIFICANT CHANGE UP (ref 43–77)
NRBC # FLD: 0 — SIGNIFICANT CHANGE UP
PHOSPHATE SERPL-MCNC: 2.5 MG/DL — SIGNIFICANT CHANGE UP (ref 2.5–4.5)
PLATELET # BLD AUTO: 206 K/UL — SIGNIFICANT CHANGE UP (ref 150–400)
PMV BLD: 10.8 FL — SIGNIFICANT CHANGE UP (ref 7–13)
POTASSIUM SERPL-MCNC: 4.2 MMOL/L — SIGNIFICANT CHANGE UP (ref 3.5–5.3)
POTASSIUM SERPL-SCNC: 4.2 MMOL/L — SIGNIFICANT CHANGE UP (ref 3.5–5.3)
PROT SERPL-MCNC: 6.7 G/DL — SIGNIFICANT CHANGE UP (ref 6–8.3)
RBC # BLD: 4.88 M/UL — SIGNIFICANT CHANGE UP (ref 4.2–5.8)
RBC # FLD: 12.5 % — SIGNIFICANT CHANGE UP (ref 10.3–14.5)
SODIUM SERPL-SCNC: 137 MMOL/L — SIGNIFICANT CHANGE UP (ref 135–145)
SPECIMEN SOURCE: SIGNIFICANT CHANGE UP
SPECIMEN SOURCE: SIGNIFICANT CHANGE UP
WBC # BLD: 15.49 K/UL — HIGH (ref 3.8–10.5)
WBC # FLD AUTO: 15.49 K/UL — HIGH (ref 3.8–10.5)

## 2018-09-24 PROCEDURE — 99239 HOSP IP/OBS DSCHRG MGMT >30: CPT

## 2018-09-24 RX ORDER — ACETAMINOPHEN 500 MG
2 TABLET ORAL
Qty: 0 | Refills: 0 | COMMUNITY

## 2018-09-24 RX ORDER — CHOLECALCIFEROL (VITAMIN D3) 125 MCG
1 CAPSULE ORAL
Qty: 0 | Refills: 0 | COMMUNITY

## 2018-09-24 RX ORDER — ASCORBIC ACID 60 MG
1 TABLET,CHEWABLE ORAL
Qty: 0 | Refills: 0 | COMMUNITY

## 2018-09-24 RX ORDER — ACETAMINOPHEN 500 MG
2 TABLET ORAL
Qty: 0 | Refills: 0 | COMMUNITY
Start: 2018-09-24

## 2018-09-24 RX ORDER — OMEGA-3 ACID ETHYL ESTERS 1 G
1 CAPSULE ORAL
Qty: 0 | Refills: 0 | COMMUNITY

## 2018-09-24 RX ORDER — FAMOTIDINE 10 MG/ML
1 INJECTION INTRAVENOUS
Qty: 0 | Refills: 0 | DISCHARGE
Start: 2018-09-24

## 2018-09-24 RX ORDER — TIZANIDINE 4 MG/1
1 TABLET ORAL
Qty: 0 | Refills: 0 | COMMUNITY

## 2018-09-24 RX ORDER — OMEPRAZOLE 10 MG/1
1 CAPSULE, DELAYED RELEASE ORAL
Qty: 0 | Refills: 0 | COMMUNITY

## 2018-09-24 RX ORDER — MULTIVIT-MIN/FERROUS GLUCONATE 9 MG/15 ML
1 LIQUID (ML) ORAL
Qty: 0 | Refills: 0 | COMMUNITY

## 2018-09-24 RX ORDER — AZTREONAM 2 G
1 VIAL (EA) INJECTION
Qty: 20 | Refills: 0 | OUTPATIENT
Start: 2018-09-24 | End: 2018-10-03

## 2018-09-24 RX ADMIN — Medication 250 MILLIGRAM(S): at 09:26

## 2018-09-24 RX ADMIN — OXYCODONE AND ACETAMINOPHEN 1 TABLET(S): 5; 325 TABLET ORAL at 10:03

## 2018-09-24 RX ADMIN — OXYCODONE AND ACETAMINOPHEN 1 TABLET(S): 5; 325 TABLET ORAL at 05:52

## 2018-09-24 RX ADMIN — OXYCODONE AND ACETAMINOPHEN 1 TABLET(S): 5; 325 TABLET ORAL at 11:03

## 2018-09-24 RX ADMIN — INFLUENZA VIRUS VACCINE 0.5 MILLILITER(S): 15; 15; 15; 15 SUSPENSION INTRAMUSCULAR at 11:41

## 2018-09-24 RX ADMIN — OXYCODONE AND ACETAMINOPHEN 1 TABLET(S): 5; 325 TABLET ORAL at 06:52

## 2018-09-24 NOTE — PROGRESS NOTE ADULT - ASSESSMENT
53 M w/ eczema p/w w/ RLE erythema, swelling and drainage likely 2/2 cellulitis, s/p I&D, LE duplex negative for DVT 53 M w/ eczema p/w w/ RLE erythema, swelling and drainage likely 2/2 cellulitis, s/p I&D, LE duplex negative for DVT. Plan for discharge on oral Abx 53 M w/ eczema p/w w/ RLE erythema, swelling and drainage likely 2/2 abscess w/ cellulitis, s/p I&D, LE duplex negative for DVT. Plan for discharge on oral Abx

## 2018-09-24 NOTE — PROGRESS NOTE ADULT - SUBJECTIVE AND OBJECTIVE BOX
Authored by Dr Andres Iniguez [phone]     Patient is a 53y old  Male who presents with a chief complaint of "pain and swelling in right leg" (23 Sep 2018 14:47)    SUBJECTIVE / OVERNIGHT EVENTS:    MEDICATIONS  (STANDING):  influenza   Vaccine 0.5 milliLiter(s) IntraMuscular once  vancomycin  IVPB 1000 milliGRAM(s) IV Intermittent every 12 hours    MEDICATIONS  (PRN):  acetaminophen   Tablet .. 650 milliGRAM(s) Oral every 6 hours PRN Temp greater or equal to 38C (100.4F), Mild Pain (1 - 3), Moderate Pain (4 - 6)  famotidine    Tablet 20 milliGRAM(s) Oral daily PRN reflux  oxyCODONE    5 mG/acetaminophen 325 mG 1 Tablet(s) Oral every 4 hours PRN Severe Pain (7 - 10)      Vital Signs Last 24 Hrs  T(C): 36.3 (24 Sep 2018 05:55), Max: 37.5 (23 Sep 2018 15:48)  T(F): 97.4 (24 Sep 2018 05:55), Max: 99.5 (23 Sep 2018 15:48)  HR: 82 (24 Sep 2018 05:55) (81 - 92)  BP: 125/73 (24 Sep 2018 05:55) (111/72 - 140/86)  BP(mean): --  RR: 18 (24 Sep 2018 05:55) (17 - 18)  SpO2: 100% (24 Sep 2018 05:55) (98% - 100%)  CAPILLARY BLOOD GLUCOSE        I&O's Summary      PHYSICAL EXAM  GENERAL: NAD, well-groomed, well-developed, resting in bed with mild discomfort  HEAD:  Atraumatic, Normocephalic  EYES: EOMI, PERRLA, conjunctiva and sclera clear  ENMT: No tonsillar erythema, exudates, or enlargement; Moist mucous membranes, Good dentition, No lesions  NECK: Supple, No JVD, Normal thyroid  CHEST/LUNG: Clear to percussion bilaterally; No rales, rhonchi, wheezing, or rubs  HEART: Regular rate and rhythm; No murmurs, rubs, or gallops  ABDOMEN: Soft, Nontender, Nondistended; Bowel sounds present  EXTREMITIES:  2+ Peripheral Pulses, No clubbing, cyanosis, or edema  LYMPH: No lymphadenopathy noted  SKIN: R shin erythema and warmth ~3cm surround site of I&D. chronic hyperpigmented patches with overlying scale diffusely over LE.   NERVOUS SYSTEM:  Alert & Oriented X3, Good concentration;   LABS:                        12.8   16.88 )-----------( 196      ( 23 Sep 2018 05:10 )             38.0     09-22    138  |  101  |  18  ----------------------------<  91  3.9   |  23  |  0.97    Ca    9.1      22 Sep 2018 16:50    TPro  7.1  /  Alb  4.1  /  TBili  1.9<H>  /  DBili  x   /  AST  23  /  ALT  20  /  AlkPhos  82  09-22                RADIOLOGY & ADDITIONAL TESTS:    Imaging Personally Reviewed: YESY and jennifer  Consultant(s) Notes Reviewed:  no consults Authored by Dr Andres Iniguez [phone]     Patient is a 53y old  Male who presents with a chief complaint of "pain and swelling in right leg" (23 Sep 2018 14:47)    SUBJECTIVE / OVERNIGHT EVENTS: No acute events overnight    MEDICATIONS  (STANDING):  influenza   Vaccine 0.5 milliLiter(s) IntraMuscular once  vancomycin  IVPB 1000 milliGRAM(s) IV Intermittent every 12 hours    MEDICATIONS  (PRN):  acetaminophen   Tablet .. 650 milliGRAM(s) Oral every 6 hours PRN Temp greater or equal to 38C (100.4F), Mild Pain (1 - 3), Moderate Pain (4 - 6)  famotidine    Tablet 20 milliGRAM(s) Oral daily PRN reflux  oxyCODONE    5 mG/acetaminophen 325 mG 1 Tablet(s) Oral every 4 hours PRN Severe Pain (7 - 10)      Vital Signs Last 24 Hrs  T(C): 36.3 (24 Sep 2018 05:55), Max: 37.5 (23 Sep 2018 15:48)  T(F): 97.4 (24 Sep 2018 05:55), Max: 99.5 (23 Sep 2018 15:48)  HR: 82 (24 Sep 2018 05:55) (81 - 92)  BP: 125/73 (24 Sep 2018 05:55) (111/72 - 140/86)  BP(mean): --  RR: 18 (24 Sep 2018 05:55) (17 - 18)  SpO2: 100% (24 Sep 2018 05:55) (98% - 100%)  CAPILLARY BLOOD GLUCOSE        I&O's Summary      PHYSICAL EXAM  GENERAL: NAD, well-groomed, well-developed, resting in bed with mild discomfort  HEAD:  Atraumatic, Normocephalic  EYES: EOMI, PERRLA, conjunctiva and sclera clear  ENMT: No tonsillar erythema, exudates, or enlargement; Moist mucous membranes, Good dentition, No lesions  NECK: Supple, No JVD, Normal thyroid  CHEST/LUNG: Clear to percussion bilaterally; No rales, rhonchi, wheezing, or rubs  HEART: Regular rate and rhythm; No murmurs, rubs, or gallops  ABDOMEN: Soft, Nontender, Nondistended; Bowel sounds present  EXTREMITIES:  2+ Peripheral Pulses, No clubbing, cyanosis, or edema  LYMPH: No lymphadenopathy noted  SKIN: R shin erythema and warmth ~3cm surround site of I&D. chronic hyperpigmented patches with overlying scale diffusely over LE.   NERVOUS SYSTEM:  Alert & Oriented X3, Good concentration;   LABS:                        12.8   16.88 )-----------( 196      ( 23 Sep 2018 05:10 )             38.0     09-22    138  |  101  |  18  ----------------------------<  91  3.9   |  23  |  0.97    Ca    9.1      22 Sep 2018 16:50    TPro  7.1  /  Alb  4.1  /  TBili  1.9<H>  /  DBili  x   /  AST  23  /  ALT  20  /  AlkPhos  82  09-22                RADIOLOGY & ADDITIONAL TESTS:    Imaging Personally Reviewed: YESY and jennifer  Consultant(s) Notes Reviewed:  no consults Authored by Dr Andres Iniguez [phone]     Patient is a 53y old  Male who presents with a chief complaint of "pain and swelling in right leg" (23 Sep 2018 14:47)    SUBJECTIVE / OVERNIGHT EVENTS: No acute events overnight  This AM pt has significantly improved pain of the RLE. He is able to ambulate, and denies fever/chills    MEDICATIONS  (STANDING):  influenza   Vaccine 0.5 milliLiter(s) IntraMuscular once  vancomycin  IVPB 1000 milliGRAM(s) IV Intermittent every 12 hours    MEDICATIONS  (PRN):  acetaminophen   Tablet .. 650 milliGRAM(s) Oral every 6 hours PRN Temp greater or equal to 38C (100.4F), Mild Pain (1 - 3), Moderate Pain (4 - 6)  famotidine    Tablet 20 milliGRAM(s) Oral daily PRN reflux  oxyCODONE    5 mG/acetaminophen 325 mG 1 Tablet(s) Oral every 4 hours PRN Severe Pain (7 - 10)      Vital Signs Last 24 Hrs  T(C): 36.3 (24 Sep 2018 05:55), Max: 37.5 (23 Sep 2018 15:48)  T(F): 97.4 (24 Sep 2018 05:55), Max: 99.5 (23 Sep 2018 15:48)  HR: 82 (24 Sep 2018 05:55) (81 - 92)  BP: 125/73 (24 Sep 2018 05:55) (111/72 - 140/86)  BP(mean): --  RR: 18 (24 Sep 2018 05:55) (17 - 18)  SpO2: 100% (24 Sep 2018 05:55) (98% - 100%)  CAPILLARY BLOOD GLUCOSE        I&O's Summary      PHYSICAL EXAM  GENERAL: NAD, well-groomed, well-developed, resting in bed with mild discomfort  HEAD:  Atraumatic, Normocephalic  EYES: EOMI, PERRLA, conjunctiva and sclera clear  ENMT: No tonsillar erythema, exudates, or enlargement; Moist mucous membranes, Good dentition, No lesions  NECK: Supple, No JVD, Normal thyroid  CHEST/LUNG: Clear to percussion bilaterally; No rales, rhonchi, wheezing, or rubs  HEART: Regular rate and rhythm; No murmurs, rubs, or gallops  ABDOMEN: Soft, Nontender, Nondistended; Bowel sounds present  EXTREMITIES:  2+ Peripheral Pulses, No clubbing, cyanosis, or edema  LYMPH: No lymphadenopathy noted  SKIN: improved R shin erythema and warmth ~3cm surround site of I&D. chronic hyperpigmented patches with overlying scale diffusely over LE.   NERVOUS SYSTEM:  Alert & Oriented X3, Good concentration;   LABS:                        12.8   16.88 )-----------( 196      ( 23 Sep 2018 05:10 )             38.0     09-22    138  |  101  |  18  ----------------------------<  91  3.9   |  23  |  0.97    Ca    9.1      22 Sep 2018 16:50    TPro  7.1  /  Alb  4.1  /  TBili  1.9<H>  /  DBili  x   /  AST  23  /  ALT  20  /  AlkPhos  82  09-22                RADIOLOGY & ADDITIONAL TESTS:    Imaging Personally Reviewed: XR and duple  Consultant(s) Notes Reviewed:  no consults
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
## 2018-09-24 NOTE — PROGRESS NOTE ADULT - ATTENDING COMMENTS
Pt seen and examined. Case d/w housestaff. Cellulitis improving s/p ID and IV abx. Wound examined at bedside with housestaff. Plan to transition to oral bactrim to complete course for cellulitis with outpatient follow-up with PCP. Pain and edema improving. Pt able to ambulate. Pt is medically stable for d/c home. D/c planning time 36mins

## 2018-09-24 NOTE — PROGRESS NOTE ADULT - PROBLEM SELECTOR PLAN 1
Pt had drainage, s/p incision and drainage; X-ray negative for radiographic signs of osteomyelitis, duplex study negative for DVT  - f/u wound cx  - f/u bcx x 2  - c/w Vanc 1 g Q12H  - f/u wound care  - tylenol PRN for pain/fever, PRN percocet for severe pain  - would hold off on MRI to r/o osteomyelitis at this time

## 2018-09-28 LAB
BACTERIA BLD CULT: SIGNIFICANT CHANGE UP
BACTERIA BLD CULT: SIGNIFICANT CHANGE UP

## 2019-04-10 ENCOUNTER — INPATIENT (INPATIENT)
Facility: HOSPITAL | Age: 54
LOS: 1 days | Discharge: HOME | End: 2019-04-12
Attending: FAMILY MEDICINE | Admitting: FAMILY MEDICINE
Payer: COMMERCIAL

## 2019-04-10 VITALS
WEIGHT: 145.06 LBS | DIASTOLIC BLOOD PRESSURE: 65 MMHG | OXYGEN SATURATION: 100 % | HEIGHT: 67 IN | RESPIRATION RATE: 20 BRPM | TEMPERATURE: 97 F | HEART RATE: 104 BPM | SYSTOLIC BLOOD PRESSURE: 121 MMHG

## 2019-04-10 DIAGNOSIS — A09 INFECTIOUS GASTROENTERITIS AND COLITIS, UNSPECIFIED: ICD-10-CM

## 2019-04-10 DIAGNOSIS — R10.84 GENERALIZED ABDOMINAL PAIN: ICD-10-CM

## 2019-04-10 DIAGNOSIS — K92.2 GASTROINTESTINAL HEMORRHAGE, UNSPECIFIED: ICD-10-CM

## 2019-04-10 DIAGNOSIS — K46.9 UNSPECIFIED ABDOMINAL HERNIA WITHOUT OBSTRUCTION OR GANGRENE: Chronic | ICD-10-CM

## 2019-04-10 LAB
ALBUMIN SERPL ELPH-MCNC: 3.6 G/DL — SIGNIFICANT CHANGE UP (ref 3.3–5)
ALP SERPL-CCNC: 87 U/L — SIGNIFICANT CHANGE UP (ref 40–120)
ALT FLD-CCNC: 33 U/L — SIGNIFICANT CHANGE UP (ref 12–78)
ANION GAP SERPL CALC-SCNC: 10 MMOL/L — SIGNIFICANT CHANGE UP (ref 5–17)
APPEARANCE UR: CLEAR — SIGNIFICANT CHANGE UP
AST SERPL-CCNC: 52 U/L — HIGH (ref 15–37)
BASOPHILS # BLD AUTO: 0.05 K/UL — SIGNIFICANT CHANGE UP (ref 0–0.2)
BASOPHILS NFR BLD AUTO: 0.5 % — SIGNIFICANT CHANGE UP (ref 0–2)
BILIRUB SERPL-MCNC: 1.2 MG/DL — SIGNIFICANT CHANGE UP (ref 0.2–1.2)
BILIRUB UR-MCNC: NEGATIVE — SIGNIFICANT CHANGE UP
BLD GP AB SCN SERPL QL: SIGNIFICANT CHANGE UP
BUN SERPL-MCNC: 8 MG/DL — SIGNIFICANT CHANGE UP (ref 7–23)
CALCIUM SERPL-MCNC: 9 MG/DL — SIGNIFICANT CHANGE UP (ref 8.5–10.1)
CHLORIDE SERPL-SCNC: 102 MMOL/L — SIGNIFICANT CHANGE UP (ref 96–108)
CO2 SERPL-SCNC: 24 MMOL/L — SIGNIFICANT CHANGE UP (ref 22–31)
COLOR SPEC: YELLOW — SIGNIFICANT CHANGE UP
CREAT SERPL-MCNC: 0.84 MG/DL — SIGNIFICANT CHANGE UP (ref 0.5–1.3)
DIFF PNL FLD: NEGATIVE — SIGNIFICANT CHANGE UP
EOSINOPHIL # BLD AUTO: 0.23 K/UL — SIGNIFICANT CHANGE UP (ref 0–0.5)
EOSINOPHIL NFR BLD AUTO: 2.1 % — SIGNIFICANT CHANGE UP (ref 0–6)
GLUCOSE SERPL-MCNC: 87 MG/DL — SIGNIFICANT CHANGE UP (ref 70–99)
GLUCOSE UR QL: NEGATIVE MG/DL — SIGNIFICANT CHANGE UP
HCT VFR BLD CALC: 41.5 % — SIGNIFICANT CHANGE UP (ref 39–50)
HGB BLD-MCNC: 14.6 G/DL — SIGNIFICANT CHANGE UP (ref 13–17)
IMM GRANULOCYTES NFR BLD AUTO: 0.3 % — SIGNIFICANT CHANGE UP (ref 0–1.5)
KETONES UR-MCNC: NEGATIVE — SIGNIFICANT CHANGE UP
LACTATE SERPL-SCNC: 0.8 MMOL/L — SIGNIFICANT CHANGE UP (ref 0.7–2)
LEUKOCYTE ESTERASE UR-ACNC: NEGATIVE — SIGNIFICANT CHANGE UP
LIDOCAIN IGE QN: 85 U/L — SIGNIFICANT CHANGE UP (ref 73–393)
LYMPHOCYTES # BLD AUTO: 1.99 K/UL — SIGNIFICANT CHANGE UP (ref 1–3.3)
LYMPHOCYTES # BLD AUTO: 18.2 % — SIGNIFICANT CHANGE UP (ref 13–44)
MCHC RBC-ENTMCNC: 30.5 PG — SIGNIFICANT CHANGE UP (ref 27–34)
MCHC RBC-ENTMCNC: 35.2 GM/DL — SIGNIFICANT CHANGE UP (ref 32–36)
MCV RBC AUTO: 86.6 FL — SIGNIFICANT CHANGE UP (ref 80–100)
MONOCYTES # BLD AUTO: 0.87 K/UL — SIGNIFICANT CHANGE UP (ref 0–0.9)
MONOCYTES NFR BLD AUTO: 8 % — SIGNIFICANT CHANGE UP (ref 2–14)
NEUTROPHILS # BLD AUTO: 7.74 K/UL — HIGH (ref 1.8–7.4)
NEUTROPHILS NFR BLD AUTO: 70.9 % — SIGNIFICANT CHANGE UP (ref 43–77)
NITRITE UR-MCNC: NEGATIVE — SIGNIFICANT CHANGE UP
NRBC # BLD: 0 /100 WBCS — SIGNIFICANT CHANGE UP (ref 0–0)
OB PNL STL: POSITIVE
PH UR: 6 — SIGNIFICANT CHANGE UP (ref 5–8)
PLATELET # BLD AUTO: 221 K/UL — SIGNIFICANT CHANGE UP (ref 150–400)
POTASSIUM SERPL-MCNC: 3.9 MMOL/L — SIGNIFICANT CHANGE UP (ref 3.5–5.3)
POTASSIUM SERPL-SCNC: 3.9 MMOL/L — SIGNIFICANT CHANGE UP (ref 3.5–5.3)
PROT SERPL-MCNC: 7.2 GM/DL — SIGNIFICANT CHANGE UP (ref 6–8.3)
PROT UR-MCNC: NEGATIVE MG/DL — SIGNIFICANT CHANGE UP
RBC # BLD: 4.79 M/UL — SIGNIFICANT CHANGE UP (ref 4.2–5.8)
RBC # FLD: 11.6 % — SIGNIFICANT CHANGE UP (ref 10.3–14.5)
SODIUM SERPL-SCNC: 136 MMOL/L — SIGNIFICANT CHANGE UP (ref 135–145)
SP GR SPEC: 1 — LOW (ref 1.01–1.02)
UROBILINOGEN FLD QL: NEGATIVE MG/DL — SIGNIFICANT CHANGE UP
WBC # BLD: 10.91 K/UL — HIGH (ref 3.8–10.5)
WBC # FLD AUTO: 10.91 K/UL — HIGH (ref 3.8–10.5)

## 2019-04-10 PROCEDURE — 99223 1ST HOSP IP/OBS HIGH 75: CPT

## 2019-04-10 PROCEDURE — 74177 CT ABD & PELVIS W/CONTRAST: CPT | Mod: 26

## 2019-04-10 PROCEDURE — 99285 EMERGENCY DEPT VISIT HI MDM: CPT

## 2019-04-10 RX ORDER — CIPROFLOXACIN LACTATE 400MG/40ML
400 VIAL (ML) INTRAVENOUS EVERY 12 HOURS
Qty: 0 | Refills: 0 | Status: DISCONTINUED | OUTPATIENT
Start: 2019-04-10 | End: 2019-04-11

## 2019-04-10 RX ORDER — IOHEXOL 300 MG/ML
30 INJECTION, SOLUTION INTRAVENOUS ONCE
Qty: 0 | Refills: 0 | Status: COMPLETED | OUTPATIENT
Start: 2019-04-10 | End: 2019-04-10

## 2019-04-10 RX ORDER — SODIUM CHLORIDE 9 MG/ML
500 INJECTION, SOLUTION INTRAVENOUS
Qty: 0 | Refills: 0 | Status: DISCONTINUED | OUTPATIENT
Start: 2019-04-11 | End: 2019-04-11

## 2019-04-10 RX ORDER — METRONIDAZOLE 500 MG
500 TABLET ORAL ONCE
Qty: 0 | Refills: 0 | Status: COMPLETED | OUTPATIENT
Start: 2019-04-10 | End: 2019-04-10

## 2019-04-10 RX ORDER — FAMOTIDINE 10 MG/ML
20 INJECTION INTRAVENOUS DAILY
Qty: 0 | Refills: 0 | Status: DISCONTINUED | OUTPATIENT
Start: 2019-04-10 | End: 2019-04-12

## 2019-04-10 RX ORDER — SODIUM CHLORIDE 9 MG/ML
3 INJECTION INTRAMUSCULAR; INTRAVENOUS; SUBCUTANEOUS ONCE
Qty: 0 | Refills: 0 | Status: COMPLETED | OUTPATIENT
Start: 2019-04-10 | End: 2019-04-10

## 2019-04-10 RX ADMIN — Medication 100 MILLIGRAM(S): at 19:56

## 2019-04-10 RX ADMIN — IOHEXOL 30 MILLILITER(S): 300 INJECTION, SOLUTION INTRAVENOUS at 19:06

## 2019-04-10 RX ADMIN — SODIUM CHLORIDE 3 MILLILITER(S): 9 INJECTION INTRAMUSCULAR; INTRAVENOUS; SUBCUTANEOUS at 18:49

## 2019-04-10 RX ADMIN — Medication 100 MILLIGRAM(S): at 20:48

## 2019-04-10 RX ADMIN — Medication 500 MILLIGRAM(S): at 21:00

## 2019-04-10 RX ADMIN — Medication 400 MILLIGRAM(S): at 21:28

## 2019-04-10 NOTE — H&P ADULT - NSHPLABSRESULTS_GEN_ALL_CORE
(10 Apr 2019 21:13)        LABS:                        14.6   10.91 )-----------( 221      ( 10 Apr 2019 18:52 )             41.5     04-10    136  |  102  |  8   ----------------------------<  87  3.9   |  24  |  0.84    Ca    9.0      10 Apr 2019 18:52    TPro  7.2  /  Alb  3.6  /  TBili  1.2  /  DBili  x   /  AST  52<H>  /  ALT  33  /  AlkPhos  87  04-10      Urinalysis Basic - ( 10 Apr 2019 20:07 )    Color: Yellow / Appearance: Clear / S.005 / pH: x  Gluc: x / Ketone: Negative  / Bili: Negative / Urobili: Negative mg/dL   Blood: x / Protein: Negative mg/dL / Nitrite: Negative   Leuk Esterase: Negative / RBC: x / WBC x   Sq Epi: x / Non Sq Epi: x / Bacteria: x      CAPILLARY BLOOD GLUCOSE                RADIOLOGY & ADDITIONAL TESTS:  < from: CT Abdomen and Pelvis w/ Oral Cont and w/ IV Cont (04.10.19 @ 20:34) >    IMPRESSION: No evidence for active bowel inflammation or bowel   obstruction.    < end of copied text >      Imaging Personally Reviewed:  [x ] YES  [ ] NO

## 2019-04-10 NOTE — ED ADULT NURSE NOTE - CHIEF COMPLAINT QUOTE
pt states, " I have been vomiting , and diarrhea on Sunday , with a lot of pain in my stomach , passing bright red blood in stool today "

## 2019-04-10 NOTE — ED ADULT NURSE REASSESSMENT NOTE - NS ED NURSE REASSESS COMMENT FT1
Seen and evaluated by Tele Hospitalist at bedside.
55yo male with no pertinent pmh presents with gen abd pain, NV, and multiple bloody diarrhea x 20/day. no travel, unusual food. pt was on bactrim 6 months ago for cellulitis, but none since. no fever.  no black stool, but BRB  No fever/chills. No photophobia/eye pain/changes in vision, No ear pain/sore throat/dysphagia, No chest pain/palpitations. No SOB/cough/stridor. +abdominal pain, +N/V/D, no black, +bloody bm. No dysuria/frequency/discharge, No headache. No Dizziness.  No rash.  No numbness/tingling/weakness.  Patient admitted to medicine for Infectious diarrhea awaiting bed availability. VS wnl, no pending stat orders noted.

## 2019-04-10 NOTE — CONSULT NOTE ADULT - SUBJECTIVE AND OBJECTIVE BOX
REASON FOR CONSULT: admission    SUBJECTIVE: Pt seen with family at bedside.  started with symptoms this past  feel has had a virus with multiple bloody bowel movements as well as nausea and vomiting. Feels a acid like feeling in his generalized abdomen and lots of gas like discomfort. Denies any other acute complaints at this time. States takes a prescribed occasionally for pain but cannot remember name.         OBJECTIVE  ROS:  unremarkable except as noted above    PHYSICAL EXAM: Tele-evaluation precludes physical exam. Pertinent physical exam findings as per verbal communication by pt and family at bedside are as following:  Gen collette: some facies of discomfort, aaox3, cooperative and pleasant with history, able to speak in full sentences    LABS AND IMAGING DATA:                        14.6   10. )-----------( 221      ( 10 Apr 2019 18:52 )             41.5     04-10    136  |  102  |  8   ----------------------------<  87  3.9   |  24  |  0.84    Ca    9.0      10 Apr 2019 18:52    TPro  7.2  /  Alb  3.6  /  TBili  1.2  /  DBili  x   /  AST  52<H>  /  ALT  33  /  AlkPhos  87  04-10    Urinalysis Basic - ( 10 Apr 2019 20:07 )    Color: Yellow / Appearance: Clear / S.005 / pH: x  Gluc: x / Ketone: Negative  / Bili: Negative / Urobili: Negative mg/dL   Blood: x / Protein: Negative mg/dL / Nitrite: Negative   Leuk Esterase: Negative / RBC: x / WBC x   Sq Epi: x / Non Sq Epi: x / Bacteria: x

## 2019-04-10 NOTE — H&P ADULT - HISTORY OF PRESENT ILLNESS
Patient is a 54y old  Male PMH eczema, who presents with a chief complaint of GI bleed; pt seen with family at bedside. States started with symptoms this past Sunday feels he has had a virus with multiple bloody bowel movements as well as nausea and vomiting. Feels an acid like feeling in his  abdomen generalized in nature, and lots of gas-like discomfort. Denies any other acute complaints at this time. States takes a prescribed occasionally for pain but cannot remember name. Patient is a 54y old  Male PMH eczema, cellulitis, who presents with a chief complaint of GI bleed; pt seen with family at bedside. States started with symptoms this past Sunday feels he has had a virus with multiple bloody bowel movements as well as nausea and vomiting. Feels an acid like feeling in his  abdomen generalized in nature, and lots of gas-like discomfort. Denies any other acute complaints at this time. States takes a prescribed medication occasionally for pain but cannot remember name.

## 2019-04-10 NOTE — ED ADULT NURSE NOTE - ED STAT RN HANDOFF DETAILS
Pt stable and resting in bed. Pt handoff report given to RN Jose  for continuum of care. No sign of distress noted.

## 2019-04-10 NOTE — H&P ADULT - ASSESSMENT
Patient is a 54y old  Male PMH eczema, who presents with a chief complaint of GI bleed; pt seen with family at bedside. States started with symptoms this past Sunday feels he has had a virus with multiple bloody bowel movements as well as nausea and vomiting. Feels an acid like feeling in his  abdomen generalized in nature, and lots of gas-like discomfort. Denies any other acute complaints at this time. States takes a prescribed occasionally for pain but cannot remember name.   IMPROVE VTE Individual Risk Assessment          RISK                                                          Points    [  ] Previous VTE                                                3    [  ] Thrombophilia                                             2    [  ] Lower limb paralysis                                    2        (unable to hold up >15 seconds)      [  ] Current Cancer                                             2         (within 6 months)    [  ] Immobilization > 24 hrs                              1    [  ] ICU/CCU stay > 24 hours                            1    [  ] Age > 60                                                    1    IMPROVE VTE Score _____0____ Patient is a 54y old  Male PMH eczema, cellulitis, who presents with a chief complaint of GI bleed; pt seen with family at bedside. States started with symptoms this past Sunday feels he has had a virus with multiple bloody bowel movements as well as nausea and vomiting. Feels an acid like feeling in his abdomen generalized in nature, and lots of gas-like discomfort. Denies any other acute complaints at this time. States takes a prescribed medication occasionally for pain but cannot remember name.   IMPROVE VTE Individual Risk Assessment          RISK                                                          Points    [  ] Previous VTE                                                3    [  ] Thrombophilia                                             2    [  ] Lower limb paralysis                                    2        (unable to hold up >15 seconds)      [  ] Current Cancer                                             2         (within 6 months)    [  ] Immobilization > 24 hrs                              1    [  ] ICU/CCU stay > 24 hours                            1    [  ] Age > 60                                                    1    IMPROVE VTE Score _____0____

## 2019-04-10 NOTE — ED PROVIDER NOTE - PHYSICAL EXAMINATION
Gen: Alert, Well appearing. NAD    Head: NC, AT, PERRL, EOMI, normal lids/conjunctiva   ENT: Bilateral TM WNL, normal hearing, patent oropharynx without erythema/exudate, uvula midline  Neck: supple, no tenderness/meningismus/JVD   Pulm: Bilateral clear BS, normal resp effort, no wheeze/stridor/retractions  CV: RRR, no M/R/G, +dist pulses   Abd: soft, + diffuse tenderness, ND, +BS, no guarding/rebound tenderness  rectal: no hemorrhoids  Mskel: no edema/erythema/cyanosis   Skin: no rash   Neuro: AAOx3, no sensory/motor deficits, CN 2-12 intact

## 2019-04-10 NOTE — ED ADULT TRIAGE NOTE - CHIEF COMPLAINT QUOTE
pt states, " I have been vomiting , and diarrhea on Sunday , with a lot of pain in my stomach " pt states, " I have been vomiting , and diarrhea on Sunday , with a lot of pain in my stomach , passing bright red blood in stool today "

## 2019-04-10 NOTE — ED ADULT NURSE NOTE - OBJECTIVE STATEMENT
Pt received in bed alert and oriented with N/V/D and abd pain. Pt states that pain started on Sunday. As per Md's orders IV helen placed blood specimen obtained and sent to the lab. Meds given and tolerated well. Pt now prepped for ct scan. Nursing care ongoing and safety maintained.

## 2019-04-10 NOTE — H&P ADULT - NSHPPHYSICALEXAM_GEN_ALL_CORE
T(C): 37 (10 Apr 2019 21:03), Max: 37 (10 Apr 2019 21:03)  T(F): 98.6 (10 Apr 2019 21:03), Max: 98.6 (10 Apr 2019 21:03)  HR: 80 (10 Apr 2019 21:03) (80 - 104)  BP: 129/78 (10 Apr 2019 21:03) (121/65 - 129/78)  BP(mean): --  RR: 18 (10 Apr 2019 21:03) (18 - 20)  SpO2: 99% (10 Apr 2019 21:03) (99% - 100%)    PHYSICAL EXAM:  GENERAL: NAD, well-groomed, well-developed  HEAD:  Atraumatic, Normocephalic  EYES: EOMI, PERRLA, conjunctiva and sclera clear  ENMT: No tonsillar erythema, exudates, or enlargement; Moist mucous membranes, Good dentition, No lesions  NECK: Supple, No JVD, Normal thyroid  NERVOUS SYSTEM:  Alert & Oriented X3, Good concentration; Motor Strength 5/5 B/L upper and lower extremities; DTRs 2+ intact and symmetric  CHEST/LUNG: Clear to percussion bilaterally; No rales, rhonchi, wheezing, or rubs  HEART: Regular rate and rhythm; No murmurs, rubs, or gallops  ABDOMEN: Soft, Nontender, Nondistended; Bowel sounds present  EXTREMITIES:  2+ Peripheral Pulses, No clubbing, cyanosis, or edema  LYMPH: No lymphadenopathy noted  SKIN: No rashes or lesions T(C): 37 (10 Apr 2019 21:03), Max: 37 (10 Apr 2019 21:03)  T(F): 98.6 (10 Apr 2019 21:03), Max: 98.6 (10 Apr 2019 21:03)  HR: 80 (10 Apr 2019 21:03) (80 - 104)  BP: 129/78 (10 Apr 2019 21:03) (121/65 - 129/78)  BP(mean): --  RR: 18 (10 Apr 2019 21:03) (18 - 20)  SpO2: 99% (10 Apr 2019 21:03) (99% - 100%)    PHYSICAL EXAM:  GENERAL: NAD, well-groomed, well-developed  HEAD:  Atraumatic, Normocephalic  EYES: EOMI, PERRLA, conjunctiva and sclera clear  ENMT: No tonsillar erythema, exudates, or enlargement; Moist mucous membranes, Good dentition, No lesions  NECK: Supple, No JVD, Normal thyroid  NERVOUS SYSTEM:  Alert & Oriented X3, Good concentration; Motor Strength 5/5 B/L upper and lower extremities; DTRs 2+ intact and symmetric  CHEST/LUNG: Clear to percussion bilaterally; No rales, rhonchi, wheezing, or rubs  HEART: Regular rate and rhythm; No murmurs, rubs, or gallops  ABDOMEN: Soft, diffuse tenderness, no guarding/rebound, Nondistended; Bowel sounds present  EXTREMITIES:  2+ Peripheral Pulses, No clubbing, cyanosis, or edema  LYMPH: No lymphadenopathy noted  SKIN: No rashes or lesions

## 2019-04-10 NOTE — ED PROVIDER NOTE - OBJECTIVE STATEMENT
53yo male with no pertinent pmh presents with gen abd pain, NV, and multiple bloody diarrhea x 20/day. no travel, unusual food. pt was on abx 6 months ago for 53yo male with no pertinent pmh presents with gen abd pain, NV, and multiple bloody diarrhea x 20/day. no travel, unusual food. pt was on bactrim 6 months ago for cellulitis, but none since. no fever.  no black stool, but BRB    ROS: No fever/chills. No photophobia/eye pain/changes in vision, No ear pain/sore throat/dysphagia, No chest pain/palpitations. No SOB/cough/stridor. +abdominal pain, +N/V/D, no black, +bloody bm. No dysuria/frequency/discharge, No headache. No Dizziness.  No rash.  No numbness/tingling/weakness.

## 2019-04-10 NOTE — H&P ADULT - NSHPREVIEWOFSYSTEMS_GEN_ALL_CORE
REVIEW OF SYSTEMS:  CONSTITUTIONAL: No fever, weight loss, or fatigue  EYES: No eye pain, visual disturbances, or discharge  ENMT:  No difficulty hearing, tinnitus, vertigo; No sinus or throat pain  NECK: No pain or stiffness  RESPIRATORY: No cough, wheezing, chills or hemoptysis; No shortness of breath  CARDIOVASCULAR: No chest pain, palpitations, dizziness, or leg swelling  GASTROINTESTINAL: +abdominal  pain. +nausea, vomiting, no hematemesis; + diarrhea no constipation. No melena, + hematochezia.  GENITOURINARY: No dysuria, frequency, hematuria, or incontinence  NEUROLOGICAL: No headaches, memory loss, loss of strength, numbness, or tremors  SKIN: No itching, burning, rashes, or lesions   LYMPH NODES: No enlarged glands  ENDOCRINE: No heat or cold intolerance; No hair loss  MUSCULOSKELETAL: No joint pain or swelling; No muscle, back, or extremity pain  PSYCHIATRIC: No depression, anxiety, mood swings, or difficulty sleeping  HEME/LYMPH: No easy bruising, or bleeding gums  ALLERGY AND IMMUNOLOGIC: No hives or eczema

## 2019-04-10 NOTE — ED PROVIDER NOTE - CLINICAL SUMMARY MEDICAL DECISION MAKING FREE TEXT BOX
Lab values do not require emergent intervention. Patient signed out to incoming physician.  All decisions regarding the progression of care will be made at their discretion. Lab values do not require emergent intervention. Patient signed out to incoming physician.  All decisions regarding the progression of care will be made at their discretion.    dr. Weems:  pt. with diarrhea, labs and imaging unremarkable, will admit as per plan with prior physician, antbx given, likely infectious origin of diarrhea given frequent bloody bowel movements

## 2019-04-10 NOTE — H&P ADULT - NSICDXFAMILYHX_GEN_ALL_CORE_FT
FAMILY HISTORY:  Family history of asthma    Sibling  Still living? Unknown  Family history of diabetes mellitus, Age at diagnosis: Age Unknown  Family history of eczema, Age at diagnosis: Age Unknown  Family history of stroke, Age at diagnosis: Age Unknown

## 2019-04-11 PROBLEM — L30.9 DERMATITIS, UNSPECIFIED: Chronic | Status: ACTIVE | Noted: 2018-09-23

## 2019-04-11 LAB
ALBUMIN SERPL ELPH-MCNC: 3.1 G/DL — LOW (ref 3.3–5)
ALP SERPL-CCNC: 75 U/L — SIGNIFICANT CHANGE UP (ref 40–120)
ALT FLD-CCNC: 27 U/L — SIGNIFICANT CHANGE UP (ref 12–78)
ANION GAP SERPL CALC-SCNC: 7 MMOL/L — SIGNIFICANT CHANGE UP (ref 5–17)
AST SERPL-CCNC: 35 U/L — SIGNIFICANT CHANGE UP (ref 15–37)
BILIRUB SERPL-MCNC: 1.5 MG/DL — HIGH (ref 0.2–1.2)
BUN SERPL-MCNC: 8 MG/DL — SIGNIFICANT CHANGE UP (ref 7–23)
CALCIUM SERPL-MCNC: 8.5 MG/DL — SIGNIFICANT CHANGE UP (ref 8.5–10.1)
CHLORIDE SERPL-SCNC: 105 MMOL/L — SIGNIFICANT CHANGE UP (ref 96–108)
CO2 SERPL-SCNC: 28 MMOL/L — SIGNIFICANT CHANGE UP (ref 22–31)
CREAT SERPL-MCNC: 0.92 MG/DL — SIGNIFICANT CHANGE UP (ref 0.5–1.3)
CULTURE RESULTS: NO GROWTH — SIGNIFICANT CHANGE UP
GLUCOSE SERPL-MCNC: 101 MG/DL — HIGH (ref 70–99)
HCT VFR BLD CALC: 42 % — SIGNIFICANT CHANGE UP (ref 39–50)
HCV AB S/CO SERPL IA: 0.05 S/CO — SIGNIFICANT CHANGE UP (ref 0–0.99)
HCV AB SERPL-IMP: SIGNIFICANT CHANGE UP
HGB BLD-MCNC: 14.3 G/DL — SIGNIFICANT CHANGE UP (ref 13–17)
MCHC RBC-ENTMCNC: 30.3 PG — SIGNIFICANT CHANGE UP (ref 27–34)
MCHC RBC-ENTMCNC: 34 GM/DL — SIGNIFICANT CHANGE UP (ref 32–36)
MCV RBC AUTO: 89 FL — SIGNIFICANT CHANGE UP (ref 80–100)
NRBC # BLD: 0 /100 WBCS — SIGNIFICANT CHANGE UP (ref 0–0)
PLATELET # BLD AUTO: 192 K/UL — SIGNIFICANT CHANGE UP (ref 150–400)
POTASSIUM SERPL-MCNC: 3.9 MMOL/L — SIGNIFICANT CHANGE UP (ref 3.5–5.3)
POTASSIUM SERPL-SCNC: 3.9 MMOL/L — SIGNIFICANT CHANGE UP (ref 3.5–5.3)
PROT SERPL-MCNC: 6.4 GM/DL — SIGNIFICANT CHANGE UP (ref 6–8.3)
RBC # BLD: 4.72 M/UL — SIGNIFICANT CHANGE UP (ref 4.2–5.8)
RBC # FLD: 11.8 % — SIGNIFICANT CHANGE UP (ref 10.3–14.5)
SODIUM SERPL-SCNC: 140 MMOL/L — SIGNIFICANT CHANGE UP (ref 135–145)
SPECIMEN SOURCE: SIGNIFICANT CHANGE UP
WBC # BLD: 5.42 K/UL — SIGNIFICANT CHANGE UP (ref 3.8–10.5)
WBC # FLD AUTO: 5.42 K/UL — SIGNIFICANT CHANGE UP (ref 3.8–10.5)

## 2019-04-11 PROCEDURE — 99233 SBSQ HOSP IP/OBS HIGH 50: CPT

## 2019-04-11 RX ORDER — ONDANSETRON 8 MG/1
4 TABLET, FILM COATED ORAL ONCE
Qty: 0 | Refills: 0 | Status: COMPLETED | OUTPATIENT
Start: 2019-04-11 | End: 2019-04-11

## 2019-04-11 RX ORDER — MORPHINE SULFATE 50 MG/1
2 CAPSULE, EXTENDED RELEASE ORAL EVERY 6 HOURS
Qty: 0 | Refills: 0 | Status: DISCONTINUED | OUTPATIENT
Start: 2019-04-11 | End: 2019-04-12

## 2019-04-11 RX ORDER — METRONIDAZOLE 500 MG
500 TABLET ORAL EVERY 8 HOURS
Qty: 0 | Refills: 0 | Status: DISCONTINUED | OUTPATIENT
Start: 2019-04-11 | End: 2019-04-11

## 2019-04-11 RX ORDER — CALCIUM CARBONATE 500(1250)
1 TABLET ORAL EVERY 6 HOURS
Qty: 0 | Refills: 0 | Status: DISCONTINUED | OUTPATIENT
Start: 2019-04-11 | End: 2019-04-12

## 2019-04-11 RX ADMIN — FAMOTIDINE 20 MILLIGRAM(S): 10 INJECTION INTRAVENOUS at 12:16

## 2019-04-11 RX ADMIN — Medication 100 MILLIGRAM(S): at 05:15

## 2019-04-11 RX ADMIN — ONDANSETRON 4 MILLIGRAM(S): 8 TABLET, FILM COATED ORAL at 21:42

## 2019-04-11 RX ADMIN — Medication 1 TABLET(S): at 21:42

## 2019-04-11 RX ADMIN — SODIUM CHLORIDE 75 MILLILITER(S): 9 INJECTION, SOLUTION INTRAVENOUS at 18:04

## 2019-04-11 RX ADMIN — SODIUM CHLORIDE 75 MILLILITER(S): 9 INJECTION, SOLUTION INTRAVENOUS at 01:12

## 2019-04-11 NOTE — CONSULT NOTE ADULT - SUBJECTIVE AND OBJECTIVE BOX
HPI:  Patient is a 54y old  Male PMH eczema, cellulitis, who presents with a chief complaint of GI bleed; pt seen with family at bedside. States started with symptoms this past  feels he has had a virus with multiple bloody bowel movements as well as nausea and vomiting. Feels an acid like feeling in his  abdomen generalized in nature, and lots of gas-like discomfort. Denies any other acute complaints at this time. States takes a prescribed medication occasionally for pain but cannot remember name. (10 Apr 2019 21:58)  ---------------------- As Above ---------------------------------  The patient was in his USOH until  afternoon when he ahd abdominal pain. Later that day, he developed bloody diarrhea. ( ~ 20 / day ( Monday, Tuesday and Wednesday )) No prior history. No recent travel, antibiotics or family with same symptoms. Ate bad food  (?)( Duck )  Feels much better today Two grainy BMs today. No abdominal pain  Last colonoscopy was ~ 10 - 15 years ago  Now off antibiotics.      PAST MEDICAL & SURGICAL HISTORY:  Eczema  Back pain  Abdominal hernia: abd hernia repair      MEDICATIONS  (STANDING):  famotidine    Tablet 20 milliGRAM(s) Oral daily  lactated ringers. 500 milliLiter(s) (75 mL/Hr) IV Continuous <Continuous>    MEDICATIONS  (PRN):  morphine  - Injectable 2 milliGRAM(s) IV Push every 6 hours PRN Severe Pain (7 - 10)      Allergies    No Known Allergies    Intolerances        FAMILY HISTORY:  Family history of asthma  Family history of stroke (Sibling)  Family history of diabetes mellitus (Sibling)  Family history of eczema (Sibling)      REVIEW OF SYSTEMS:    CONSTITUTIONAL: No fever, weight loss, or fatigue  EYES: No eye pain, visual disturbances, or discharge  ENMT:  No difficulty hearing, tinnitus, vertigo; No sinus or throat pain  NECK: No pain or stiffness  BREASTS: No pain, masses, or nipple discharge  RESPIRATORY: No cough, wheezing, chills or hemoptysis; No shortness of breath  CARDIOVASCULAR: No chest pain, palpitations, dizziness, or leg swelling  GASTROINTESTINAL: See above  GENITOURINARY: No dysuria, frequency, hematuria, or incontinence  NEUROLOGICAL: No headaches, memory loss, loss of strength, numbness, or tremors  SKIN: No itching, burning, rashes, or lesions   LYMPH NODES: No enlarged glands  ENDOCRINE: No heat or cold intolerance; No hair loss  MUSCULOSKELETAL: No joint pain or swelling; No muscle, back, or extremity pain  PSYCHIATRIC: No depression, anxiety, mood swings, or difficulty sleeping  HEME/LYMPH: No easy bruising, or bleeding gums  ALLERGY AND IMMUNOLOGIC: No hives or eczema          SOCIAL HISTORY:    FAMILY HISTORY:  Family history of asthma  Family history of stroke (Sibling)  Family history of diabetes mellitus (Sibling)  Family history of eczema (Sibling)      Vital Signs Last 24 Hrs  T(C): 36.4 (2019 12:17), Max: 37 (10 Apr 2019 21:03)  T(F): 97.6 (2019 12:17), Max: 98.6 (10 Apr 2019 21:03)  HR: 67 (2019 12:17) (65 - 104)  BP: 126/66 (2019 12:17) (112/74 - 129/78)  BP(mean): --  RR: 17 (2019 12:17) (17 - 20)  SpO2: 100% (2019 12:17) (97% - 100%)    PHYSICAL EXAM:    GENERAL: NAD, well-groomed, well-developed  HEAD:  Atraumatic, Normocephalic  EYES: EOMI, PERRLA, conjunctiva and sclera clear  NECK: Supple, No JVD, Normal thyroid  NERVOUS SYSTEM:  Alert & Oriented X3, Good concentration; Motor Strength 5/5 B/L upper and lower extremities;   CHEST/LUNG: Clear to percussion bilaterally; No rales, rhonchi, wheezing, or rubs  HEART: Regular rate and rhythm; No murmurs, rubs, or gallops  ABDOMEN: Soft, Nontender, Nondistended; Bowel sounds present  EXTREMITIES:  2+ Peripheral Pulses, No clubbing, cyanosis, or edema  LYMPH: No lymphadenopathy noted   RECTAL: Deferred    SKIN: No rashes or lesions    LABS:                        14.3   5.42  )-----------( 192      ( 2019 08:44 )             42.0       CBC:  04-11 @ 08:44  WBC  5.42  HGB 14.3  HCT 42.0 Plate 192  MCV 89.0  04-10 @ 18:52  WBC  10.91  HGB 14.6  HCT 41.5 Plate 221  MCV 86.6           2019 08:44    140    |  105    |  8      ----------------------------<  101    3.9     |  28     |  0.92   10 Apr 2019 18:52    136    |  102    |  8      ----------------------------<  87     3.9     |  24     |  0.84     Ca    8.5        2019 08:44  Ca    9.0        10 Apr 2019 18:52    TPro  6.4    /  Alb  3.1    /  TBili  1.5    /  DBili  x      /  AST  35     /  ALT  27     /  AlkPhos  75     2019 08:44  TPro  7.2    /  Alb  3.6    /  TBili  1.2    /  DBili  x      /  AST  52     /  ALT  33     /  AlkPhos  87     10 Apr 2019 18:52      Urinalysis Basic - ( 10 Apr 2019 20:07 )    Color: Yellow / Appearance: Clear / S.005 / pH: x  Gluc: x / Ketone: Negative  / Bili: Negative / Urobili: Negative mg/dL   Blood: x / Protein: Negative mg/dL / Nitrite: Negative   Leuk Esterase: Negative / RBC: x / WBC x   Sq Epi: x / Non Sq Epi: x / Bacteria: x          RADIOLOGY & ADDITIONAL STUDIES:

## 2019-04-11 NOTE — PROGRESS NOTE ADULT - PROBLEM SELECTOR PLAN 1
- NPO for now but advance to clears if no EGD today  - pending GI eval  - ? hemorrhoidal vs diverticular bleed

## 2019-04-11 NOTE — PROGRESS NOTE ADULT - SUBJECTIVE AND OBJECTIVE BOX
Patient is a 54y old  Male who presents with a chief complaint of Lower GI bleed. (10 Apr 2019 21:58)        HPI:  Patient is a 54y old  Male PMH eczema, cellulitis, who presents with a chief complaint of GI bleed; pt seen with family at bedside. States started with symptoms this past  feels he has had a virus with multiple bloody bowel movements as well as nausea and vomiting. Feels an acid like feeling in his  abdomen generalized in nature, and lots of gas-like discomfort. Denies any other acute complaints at this time. States takes a prescribed medication occasionally for pain but cannot remember name. (10 Apr 2019 21:58)      SUBJECTIVE & OBJECTIVE: Pt seen and examined at bedside. He states that he is feeling better.  Denies further episodes of diarrhea.     PHYSICAL EXAM:  T(C): 35.8 (19 @ 05:30), Max: 37 (04-10-19 @ 21:03)  HR: 65 (19 @ 05:30) (65 - 104)  BP: 112/74 (19 @ 05:30) (112/74 - 129/78)  RR: 17 (19 @ 05:30) (17 - 20)  SpO2: 98% (19 @ 05:30) (97% - 100%)  Wt(kg): -- Height (cm): 170.18 (04-10 @ 23:30)  Weight (kg): 64.5 (04-10 @ 23:30)  BMI (kg/m2): 22.3 (04-10 @ 23:30)  BSA (m2): 1.75 (04-10 @ 23:30)  I&O's Detail    2019 07:01  -  2019 11:05  --------------------------------------------------------  IN:  Total IN: 0 mL    OUT:    Voided: 400 mL  Total OUT: 400 mL    Total NET: -400 mL        GENERAL: NAD, well-groomed, well-developed  HEAD:  Atraumatic, Normocephalic  EYES: EOMI, PERRLA, conjunctiva and sclera clear  ENMT: Moist mucous membranes  NECK: Supple, No JVD  NERVOUS SYSTEM:  Alert & Oriented X3, Motor Strength 5/5 B/L upper and lower extremities; DTRs 2+ intact and symmetric  CHEST/LUNG: Clear to auscultation bilaterally; No rales, rhonchi, wheezing, or rubs  HEART: Regular rate and rhythm; No murmurs, rubs, or gallops  ABDOMEN: Soft, Nontender, Nondistended; Bowel sounds present  EXTREMITIES:  2+ Peripheral Pulses, No clubbing, cyanosis, or edema    MEDICATIONS  (STANDING):  ciprofloxacin   IVPB 400 milliGRAM(s) IV Intermittent every 12 hours  famotidine    Tablet 20 milliGRAM(s) Oral daily  lactated ringers. 500 milliLiter(s) (75 mL/Hr) IV Continuous <Continuous>  metroNIDAZOLE  IVPB 500 milliGRAM(s) IV Intermittent every 8 hours    MEDICATIONS  (PRN):  morphine  - Injectable 2 milliGRAM(s) IV Push every 6 hours PRN Severe Pain (7 - 10)      LABS:                        14.3   5.42  )-----------( 192      ( 2019 08:44 )             42.0     04-    140  |  105  |  8   ----------------------------<  101<H>  3.9   |  28  |  0.92    Ca    8.5      2019 08:44    TPro  6.4  /  Alb  3.1<L>  /  TBili  1.5<H>  /  DBili  x   /  AST  35  /  ALT  27  /  AlkPhos  75  04-11      Urinalysis Basic - ( 10 Apr 2019 20:07 )    Color: Yellow / Appearance: Clear / S.005 / pH: x  Gluc: x / Ketone: Negative  / Bili: Negative / Urobili: Negative mg/dL   Blood: x / Protein: Negative mg/dL / Nitrite: Negative   Leuk Esterase: Negative / RBC: x / WBC x   Sq Epi: x / Non Sq Epi: x / Bacteria: x  RECENT CULTURES:pending blood cultures.      RADIOLOGY & ADDITIONAL TESTS:  < from: CT Abdomen and Pelvis w/ Oral Cont and w/ IV Cont (04.10.19 @ 20:34) >  IMPRESSION: No evidence for active bowel inflammation or bowel   obstruction.    < end of copied text >      DVT/GI ppx  Discussed with pt @ bedside

## 2019-04-11 NOTE — CONSULT NOTE ADULT - ASSESSMENT
HPI:  Patient is a 54y old  Male PMH eczema, cellulitis, who presents with a chief complaint of GI bleed; pt seen with family at bedside. States started with symptoms this past Sunday feels he has had a virus with multiple bloody bowel movements as well as nausea and vomiting. Feels an acid like feeling in his  abdomen generalized in nature, and lots of gas-like discomfort. Denies any other acute complaints at this time. States takes a prescribed medication occasionally for pain but cannot remember name. (10 Apr 2019 21:58)  ---------------------- As Above ---------------------------------  The patient was in his USOH until Sunday afternoon when he ahd abdominal pain. Later that day, he developed bloody diarrhea. ( ~ 20 / day ( Monday, Tuesday and Wednesday )) No prior history. No recent travel, antibiotics or family with same symptoms. Ate bad food Sunday (?)( Duck )  Feels much better today Two grainy BMs today. No abdominal pain  Last colonoscopy was ~ 10 - 15 years ago  Now off antibiotics.    Bloody diarrhea - probably infectious - significantly better ( tolerating clear liquids ). Advance diet as tolerated. Agree holding antibiotics. Stable for d/c once patient tolerates regular diet. Out patient colonoscopy.

## 2019-04-12 ENCOUNTER — TRANSCRIPTION ENCOUNTER (OUTPATIENT)
Age: 54
End: 2019-04-12

## 2019-04-12 VITALS
SYSTOLIC BLOOD PRESSURE: 137 MMHG | DIASTOLIC BLOOD PRESSURE: 76 MMHG | OXYGEN SATURATION: 99 % | HEART RATE: 95 BPM | RESPIRATION RATE: 18 BRPM | TEMPERATURE: 99 F

## 2019-04-12 PROCEDURE — 99239 HOSP IP/OBS DSCHRG MGMT >30: CPT

## 2019-04-12 RX ADMIN — FAMOTIDINE 20 MILLIGRAM(S): 10 INJECTION INTRAVENOUS at 11:50

## 2019-04-12 NOTE — DISCHARGE NOTE PROVIDER - HOSPITAL COURSE
Patient is a 54y old  Male who presents with a chief complaint of Lower GI bleed. (2019 16:21)                HPI:    Patient is a 54y old  Male PMH eczema, cellulitis, who presents with a chief complaint of GI bleed; pt seen with family at bedside. States started with symptoms this past  feels he has had a virus with multiple bloody bowel movements as well as nausea and vomiting. Feels an acid like feeling in his  abdomen generalized in nature, and lots of gas-like discomfort. Denies any other acute complaints at this time. States takes a prescribed medication occasionally for pain but cannot remember name. (10 Apr 2019 21:58)            SUBJECTIVE & OBJECTIVE: Pt seen and examined at bedside. Doing well during this hospital stay.  Tolerating diet, will follow up with GI as op    PHYSICAL EXAM:    T(C): 36 (19 @ 12:46), Max: 36.8 (19 @ 04:53)    HR: 82 (- @ 12:46) (65 - 82)    BP: 132/78 (-19 @ 12:46) (124/76 - 132/78)    RR: 18 (-19 @ 12:46) (17 - 18)    SpO2: 100% (19 @ 12:46) (97% - 100%)    Wt(kg): --     I&O's Detail        2019 07:01  -  2019 07:00    --------------------------------------------------------    IN:      lactated ringers.: 150 mL      Oral Fluid: 355 mL    Total IN: 505 mL        OUT:      Voided: 1150 mL    Total OUT: 1150 mL        Total NET: -645 mL                GENERAL: NAD, well-groomed, well-developed    HEAD:  Atraumatic, Normocephalic    EYES: EOMI, PERRLA, conjunctiva and sclera clear    ENMT: Moist mucous membranes    NECK: Supple, No JVD    NERVOUS SYSTEM:  Alert & Oriented X3, Motor Strength 5/5 B/L upper and lower extremities; DTRs 2+ intact and symmetric    CHEST/LUNG: Clear to auscultation bilaterally; No rales, rhonchi, wheezing, or rubs    HEART: Regular rate and rhythm; No murmurs, rubs, or gallops    ABDOMEN: Soft, Nontender, Nondistended; Bowel sounds present    EXTREMITIES:  2+ Peripheral Pulses, No clubbing, cyanosis, or edema                LABS:                            14.3     5.42  )-----------( 192      ( 2019 08:44 )               42.0                 140  |  105  |  8     ----------------------------<  101<H>    3.9   |  28  |  0.92        Ca    8.5      2019 08:44        TPro  6.4  /  Alb  3.1<L>  /  TBili  1.5<H>  /  DBili  x   /  AST  35  /  ALT  27  /  AlkPhos  75              Urinalysis Basic - ( 10 Apr 2019 20:07 )        Color: Yellow / Appearance: Clear / S.005 / pH: x    Gluc: x / Ketone: Negative  / Bili: Negative / Urobili: Negative mg/dL     Blood: x / Protein: Negative mg/dL / Nitrite: Negative     Leuk Esterase: Negative / RBC: x / WBC x     Sq Epi: x / Non Sq Epi: x / Bacteria: x    RECENT CULTURES:    Urine culture:     @ 10:01 --     No enteric pathogens to date: Final culture pending    No enteric gram negative rods isolated    Urine culture:     @ 01:31 --     No growth    RADIOLOGY & ADDITIONAL TESTS: < from: CT Abdomen and Pelvis w/ Oral Cont and w/ IV Cont (04.10.19 @ 20:34) >    MPRESSION: No evidence for active bowel inflammation or bowel obstruction.< end of copied text >

## 2019-04-12 NOTE — DISCHARGE NOTE PROVIDER - NSDCCPCAREPLAN_GEN_ALL_CORE_FT
PRINCIPAL DISCHARGE DIAGNOSIS  Diagnosis: Lower GI bleed  Assessment and Plan of Treatment: Likely Diverticular vs. Hemorrhoidal bleed  Outpatient follow up by [facility]      SECONDARY DISCHARGE DIAGNOSES  Diagnosis: Diarrhea of infectious origin  Assessment and Plan of Treatment: unlikely infectious diarrhea

## 2019-04-12 NOTE — PROGRESS NOTE ADULT - PROBLEM SELECTOR PLAN 1
Almost completely resolved. Tolerated full liquids. Patient can be discharged from GI point of view when / if tolerates regular food. Patient knows to see me in follow up

## 2019-04-12 NOTE — PROGRESS NOTE ADULT - SUBJECTIVE AND OBJECTIVE BOX
Patient is a 54y old  Male who presents with a chief complaint of Lower GI bleed. (2019 16:21)      HPI:  Patient is a 54y old  Male PMH eczema, cellulitis, who presents with a chief complaint of GI bleed; pt seen with family at bedside. States started with symptoms this past  feels he has had a virus with multiple bloody bowel movements as well as nausea and vomiting. Feels an acid like feeling in his  abdomen generalized in nature, and lots of gas-like discomfort. Denies any other acute complaints at this time. States takes a prescribed medication occasionally for pain but cannot remember name. (10 Apr 2019 21:58)      INTERVAL HPI/OVERNIGHT EVENTS: Patient seen earlier today  One soft BM since last seen. The patient denies melena, hematochezia, hematemesis, nausea, vomiting, abdominal pain, constipation, diarrhea, or change in bowel movements Tolerating full liquids    MEDICATIONS  (STANDING):  famotidine    Tablet 20 milliGRAM(s) Oral daily    MEDICATIONS  (PRN):  calcium carbonate    500 mG (Tums) Chewable 1 Tablet(s) Chew every 6 hours PRN Heartburn  morphine  - Injectable 2 milliGRAM(s) IV Push every 6 hours PRN Severe Pain (7 - 10)      FAMILY HISTORY:  Family history of asthma  Family history of stroke (Sibling)  Family history of diabetes mellitus (Sibling)  Family history of eczema (Sibling)      Allergies    No Known Allergies    Intolerances        PMH/PSH:  Eczema  Back pain  Abdominal hernia  No significant past surgical history        REVIEW OF SYSTEMS:  CONSTITUTIONAL: No fever, weight loss, or fatigue  EYES: No eye pain, visual disturbances, or discharge  ENMT:  No difficulty hearing, tinnitus, vertigo; No sinus or throat pain  NECK: No pain or stiffness  BREASTS: No pain, masses, or nipple discharge  RESPIRATORY: No cough, wheezing, chills or hemoptysis; No shortness of breath  CARDIOVASCULAR: No chest pain, palpitations, dizziness, or leg swelling  GASTROINTESTINAL:  see above  GENITOURINARY: No dysuria, frequency, hematuria, or incontinence  NEUROLOGICAL: No headaches, memory loss, loss of strength, numbness, or tremors  SKIN: No itching, burning, rashes, or lesions   LYMPH NODES: No enlarged glands  ENDOCRINE: No heat or cold intolerance; No hair loss  MUSCULOSKELETAL: No joint pain or swelling; No muscle, back, or extremity pain  PSYCHIATRIC: No depression, anxiety, mood swings, or difficulty sleeping  HEME/LYMPH: No easy bruising, or bleeding gums  ALLERGY AND IMMUNOLOGIC: No hives or eczema    Vital Signs Last 24 Hrs  T(C): 36 (2019 12:46), Max: 36.8 (2019 04:53)  T(F): 96.8 (2019 12:46), Max: 98.2 (2019 04:53)  HR: 82 (2019 12:46) (65 - 82)  BP: 132/78 (2019 12:46) (124/76 - 132/78)  BP(mean): --  RR: 18 (2019 12:46) (17 - 18)  SpO2: 100% (2019 12:46) (97% - 100%)    PHYSICAL EXAM:  GENERAL: NAD, well-groomed, well-developed  HEAD:  Atraumatic, Normocephalic  EYES: EOMI, PERRLA, conjunctiva and sclera clear  ENMT: No tonsillar erythema, exudates, or enlargement;   NECK: Supple, No JVD, Normal thyroid  NERVOUS SYSTEM:  Alert & Oriented X3, Good concentration; Motor Strength 5/5 B/L upper and lower extremities;   CHEST/LUNG: Clear to percussion bilaterally; No rales, rhonchi, wheezing, or rubs  HEART: Regular rate and rhythm; No murmurs, rubs, or gallops  ABDOMEN: Soft, Nontender, Nondistended; Bowel sounds present  EXTREMITIES:  2+ Peripheral Pulses, No clubbing, cyanosis, or edema  LYMPH: No lymphadenopathy noted  SKIN: No rashes or lesions    LAB  04-10 @ 18:52  amylase --   lipase 85                           14.3   5.42  )-----------( 192      ( 2019 08:44 )             42.0       CBC:   @ 08:44  WBC 5.42   Hgb 14.3   Hct 42.0   Plts 192  MCV 89.0  04-10 @ 18:52  WBC 10.91   Hgb 14.6   Hct 41.5   Plts 221  MCV 86.6      Chemistry:   @ 08:44  Na+ 140  K+ 3.9  Cl- 105  CO2 28  BUN 8  Cr 0.92     04-10 @ 18:52  Na+ 136  K+ 3.9  Cl- 102  CO2 24  BUN 8  Cr 0.84         Glucose, Serum: 101 mg/dL ( @ 08:44)  Glucose, Serum: 87 mg/dL (04-10 @ 18:52)      2019 08:44    140    |  105    |  8      ----------------------------<  101    3.9     |  28     |  0.92   10 Apr 2019 18:52    136    |  102    |  8      ----------------------------<  87     3.9     |  24     |  0.84     Ca    8.5        2019 08:44  Ca    9.0        10 Apr 2019 18:52    TPro  6.4    /  Alb  3.1    /  TBili  1.5    /  DBili  x      /  AST  35     /  ALT  27     /  AlkPhos  75     2019 08:44  TPro  7.2    /  Alb  3.6    /  TBili  1.2    /  DBili  x      /  AST  52     /  ALT  33     /  AlkPhos  87     10 Apr 2019 18:52          Urinalysis Basic - ( 10 Apr 2019 20:07 )    Color: Yellow / Appearance: Clear / S.005 / pH: x  Gluc: x / Ketone: Negative  / Bili: Negative / Urobili: Negative mg/dL   Blood: x / Protein: Negative mg/dL / Nitrite: Negative   Leuk Esterase: Negative / RBC: x / WBC x   Sq Epi: x / Non Sq Epi: x / Bacteria: x        CAPILLARY BLOOD GLUCOSE              RADIOLOGY & ADDITIONAL TESTS:    Imaging Personally Reviewed:  [ ] YES  [ ] NO    Consultant(s) Notes Reviewed:  [ ] YES  [ ] NO    Care Discussed with Consultants/Other Providers [ ] YES  [ ] NO

## 2019-04-12 NOTE — CHART NOTE - NSCHARTNOTEFT_GEN_A_CORE
Medicine Hospitalist PA    Dear Whomever this may concern,     Mr. Yves Faustin was hospitalized at [facility] from April 10th, 2019 through April 12, 2019. Mr. Faustin may resume work after discharge. Please excuse his absence. If there are any questions or concerns, please call [phone].     Thank you,   Padmaja Wilson PA-C

## 2019-04-12 NOTE — DISCHARGE NOTE NURSING/CASE MANAGEMENT/SOCIAL WORK - NSDCDPATPORTLINK_GEN_ALL_CORE
You can access the [facility], offered by [facility], by registering with the following website: [email or web address]

## 2019-04-13 LAB
CULTURE RESULTS: SIGNIFICANT CHANGE UP
SPECIMEN SOURCE: SIGNIFICANT CHANGE UP

## 2019-04-19 DIAGNOSIS — K57.31 DIVERTICULOSIS OF LARGE INTESTINE WITHOUT PERFORATION OR ABSCESS WITH BLEEDING: ICD-10-CM

## 2019-04-19 DIAGNOSIS — L30.9 DERMATITIS, UNSPECIFIED: ICD-10-CM

## 2019-04-19 DIAGNOSIS — R19.7 DIARRHEA, UNSPECIFIED: ICD-10-CM

## 2019-04-19 DIAGNOSIS — K64.9 UNSPECIFIED HEMORRHOIDS: ICD-10-CM

## 2019-06-03 ENCOUNTER — APPOINTMENT (OUTPATIENT)
Dept: GASTROENTEROLOGY | Facility: CLINIC | Age: 54
End: 2019-06-03
Payer: COMMERCIAL

## 2019-06-03 VITALS
BODY MASS INDEX: 22.76 KG/M2 | RESPIRATION RATE: 17 BRPM | SYSTOLIC BLOOD PRESSURE: 119 MMHG | WEIGHT: 145 LBS | HEART RATE: 77 BPM | HEIGHT: 67 IN | TEMPERATURE: 98 F | DIASTOLIC BLOOD PRESSURE: 80 MMHG | OXYGEN SATURATION: 98 %

## 2019-06-03 DIAGNOSIS — Z12.12 ENCOUNTER FOR SCREENING FOR MALIGNANT NEOPLASM OF COLON: ICD-10-CM

## 2019-06-03 DIAGNOSIS — Z01.818 ENCOUNTER FOR OTHER PREPROCEDURAL EXAMINATION: ICD-10-CM

## 2019-06-03 DIAGNOSIS — R10.9 UNSPECIFIED ABDOMINAL PAIN: ICD-10-CM

## 2019-06-03 DIAGNOSIS — Z12.11 ENCOUNTER FOR SCREENING FOR MALIGNANT NEOPLASM OF COLON: ICD-10-CM

## 2019-06-03 DIAGNOSIS — Z78.9 OTHER SPECIFIED HEALTH STATUS: ICD-10-CM

## 2019-06-03 DIAGNOSIS — K21.9 GASTRO-ESOPHAGEAL REFLUX DISEASE W/OUT ESOPHAGITIS: ICD-10-CM

## 2019-06-03 DIAGNOSIS — Z82.49 FAMILY HISTORY OF ISCHEMIC HEART DISEASE AND OTHER DISEASES OF THE CIRCULATORY SYSTEM: ICD-10-CM

## 2019-06-03 PROCEDURE — 99204 OFFICE O/P NEW MOD 45 MIN: CPT

## 2019-06-03 RX ORDER — ESOMEPRAZOLE MAGNESIUM 40 MG/1
40 GRANULE, DELAYED RELEASE ORAL
Refills: 0 | Status: ACTIVE | COMMUNITY

## 2019-06-03 NOTE — REVIEW OF SYSTEMS
[As Noted in HPI] : as noted in HPI [Abdominal Pain] : abdominal pain [Diarrhea] : diarrhea [Heartburn] : heartburn [Negative] : Heme/Lymph [Vomiting] : no vomiting [Melena] : no melena [Constipation] : no constipation

## 2019-06-03 NOTE — HISTORY OF PRESENT ILLNESS
[Heartburn] : stable heartburn [Vomiting] : denies vomiting [Nausea] : denies nausea [Constipation] : denies constipation [Abdominal Swelling] : denies abdominal swelling [Yellow Skin Or Eyes (Jaundice)] : denies jaundice [Rectal Pain] : denies rectal pain [Abdominal Pain] : abdominal pain [GERD] : gastroesophageal reflux disease [Diarrhea] : diarrhea [Wt Loss ___ Lbs] : recent [unfilled] ~Upound(s) weight loss [Wt Gain ___ Lbs] : no recent weight gain [Hiatus Hernia] : no hiatus hernia [Pancreatitis] : no pancreatitis [Peptic Ulcer Disease] : no peptic ulcer disease [Kidney Stone] : no kidney stone [Cholelithiasis] : no cholelithiasis [Inflammatory Bowel Disease] : no inflammatory bowel disease [Irritable Bowel Syndrome] : no irritable bowel syndrome [Diverticulitis] : no diverticulitis [Malignancy] : no malignancy [Alcohol Abuse] : no alcohol abuse [Abdominal Surgery] : no abdominal surgery [Cholecystectomy] : no cholecystectomy [Appendectomy] : no appendectomy [de-identified] : 54 year old man hospitalized in April at [facility] for bloody diarrhea. He had loose bowel movements and diarrhea with abdominal pain. A CT of the abdomen on 410/19 was engrave. Stool cultures were negative. He also complains of GERD for which he takes Nexium with limited relief. He denies melena or hematemesis. His bowel movements are more regular with no further bleeding. He had a previous colonoscopy 10 years ago that was negative.

## 2019-06-03 NOTE — PHYSICAL EXAM
[General Appearance - Alert] : alert [General Appearance - In No Acute Distress] : in no acute distress [Sclera] : the sclera and conjunctiva were normal [PERRL With Normal Accommodation] : pupils were equal in size, round, and reactive to light [Extraocular Movements] : extraocular movements were intact [Outer Ear] : the ears and nose were normal in appearance [Oropharynx] : the oropharynx was normal [Neck Appearance] : the appearance of the neck was normal [Neck Cervical Mass (___cm)] : no neck mass was observed [Jugular Venous Distention Increased] : there was no jugular-venous distention [Thyroid Diffuse Enlargement] : the thyroid was not enlarged [Thyroid Nodule] : there were no palpable thyroid nodules [Auscultation Breath Sounds / Voice Sounds] : lungs were clear to auscultation bilaterally [Heart Rate And Rhythm] : heart rate was normal and rhythm regular [Heart Sounds] : normal S1 and S2 [Heart Sounds Gallop] : no gallops [Heart Sounds Pericardial Friction Rub] : no pericardial rub [Murmurs] : no murmurs [Abdomen Soft] : soft [Bowel Sounds] : normal bowel sounds [Abdomen Tenderness] : non-tender [Cervical Lymph Nodes Enlarged Posterior Bilaterally] : posterior cervical [Abdomen Mass (___ Cm)] : no abdominal mass palpated [Cervical Lymph Nodes Enlarged Anterior Bilaterally] : anterior cervical [Supraclavicular Lymph Nodes Enlarged Bilaterally] : supraclavicular [No CVA Tenderness] : no ~M costovertebral angle tenderness [No Spinal Tenderness] : no spinal tenderness [Abnormal Walk] : normal gait [Nail Clubbing] : no clubbing  or cyanosis of the fingernails [Musculoskeletal - Swelling] : no joint swelling seen [Motor Tone] : muscle strength and tone were normal [Skin Color & Pigmentation] : normal skin color and pigmentation [Skin Turgor] : normal skin turgor [] : no rash [Deep Tendon Reflexes (DTR)] : deep tendon reflexes were 2+ and symmetric [Sensation] : the sensory exam was normal to light touch and pinprick [No Focal Deficits] : no focal deficits [Oriented To Time, Place, And Person] : oriented to person, place, and time [Impaired Insight] : insight and judgment were intact [Affect] : the affect was normal

## 2019-06-07 ENCOUNTER — APPOINTMENT (OUTPATIENT)
Dept: GASTROENTEROLOGY | Facility: HOSPITAL | Age: 54
End: 2019-06-07
Payer: COMMERCIAL

## 2019-06-07 ENCOUNTER — RESULT REVIEW (OUTPATIENT)
Age: 54
End: 2019-06-07

## 2019-06-07 ENCOUNTER — OUTPATIENT (OUTPATIENT)
Dept: OUTPATIENT SERVICES | Facility: HOSPITAL | Age: 54
LOS: 1 days | Discharge: HOME | End: 2019-06-07
Payer: COMMERCIAL

## 2019-06-07 DIAGNOSIS — K46.9 UNSPECIFIED ABDOMINAL HERNIA WITHOUT OBSTRUCTION OR GANGRENE: Chronic | ICD-10-CM

## 2019-06-07 DIAGNOSIS — K21.9 GASTRO-ESOPHAGEAL REFLUX DISEASE WITHOUT ESOPHAGITIS: ICD-10-CM

## 2019-06-07 PROCEDURE — 45380 COLONOSCOPY AND BIOPSY: CPT

## 2019-06-07 PROCEDURE — 43239 EGD BIOPSY SINGLE/MULTIPLE: CPT

## 2019-06-07 PROCEDURE — 88305 TISSUE EXAM BY PATHOLOGIST: CPT | Mod: 26

## 2019-06-07 PROCEDURE — 88312 SPECIAL STAINS GROUP 1: CPT | Mod: 26

## 2019-06-11 LAB
HEMOCCULT STL QL IA: NEGATIVE
SURGICAL PATHOLOGY STUDY: SIGNIFICANT CHANGE UP

## 2019-06-27 ENCOUNTER — APPOINTMENT (OUTPATIENT)
Dept: GASTROENTEROLOGY | Facility: CLINIC | Age: 54
End: 2019-06-27
Payer: COMMERCIAL

## 2019-06-27 VITALS
DIASTOLIC BLOOD PRESSURE: 80 MMHG | SYSTOLIC BLOOD PRESSURE: 130 MMHG | OXYGEN SATURATION: 99 % | TEMPERATURE: 98.9 F | BODY MASS INDEX: 22.76 KG/M2 | HEART RATE: 86 BPM | WEIGHT: 145 LBS | HEIGHT: 67 IN

## 2019-06-27 DIAGNOSIS — Z71.2 PERSON CONSULTING FOR EXPLANATION OF EXAMINATION OR TEST FINDINGS: ICD-10-CM

## 2019-06-27 DIAGNOSIS — R19.7 DIARRHEA, UNSPECIFIED: ICD-10-CM

## 2019-06-27 DIAGNOSIS — K64.4 RESIDUAL HEMORRHOIDAL SKIN TAGS: ICD-10-CM

## 2019-06-27 PROCEDURE — 99213 OFFICE O/P EST LOW 20 MIN: CPT

## 2019-06-27 RX ORDER — POLYETHYLENE GLYCOL 3350, SODIUM CHLORIDE, SODIUM BICARBONATE AND POTASSIUM CHLORIDE WITH LEMON FLAVOR 420; 11.2; 5.72; 1.48 G/4L; G/4L; G/4L; G/4L
420 POWDER, FOR SOLUTION ORAL
Qty: 1 | Refills: 0 | Status: COMPLETED | COMMUNITY
Start: 2019-06-03 | End: 2019-06-27

## 2019-06-27 RX ORDER — HYDROCORTISONE 25 MG/G
2.5 CREAM TOPICAL TWICE DAILY
Qty: 1 | Refills: 2 | Status: ACTIVE | COMMUNITY
Start: 2019-06-27 | End: 1900-01-01

## 2019-06-27 NOTE — HISTORY OF PRESENT ILLNESS
[de-identified] : 54 year old man with rectal bleeding. He underwent a coloscopy on 6/7 that was negative except for external inflamed hemorrhoid. He still complains of gas and bloating. EGD and biopsy were negative.

## 2019-06-27 NOTE — PHYSICAL EXAM
[General Appearance - In No Acute Distress] : in no acute distress [General Appearance - Alert] : alert [Neck Appearance] : the appearance of the neck was normal [Neck Cervical Mass (___cm)] : no neck mass was observed [Jugular Venous Distention Increased] : there was no jugular-venous distention [Thyroid Nodule] : there were no palpable thyroid nodules [Thyroid Diffuse Enlargement] : the thyroid was not enlarged [Auscultation Breath Sounds / Voice Sounds] : lungs were clear to auscultation bilaterally [Heart Rate And Rhythm] : heart rate was normal and rhythm regular [Heart Sounds] : normal S1 and S2 [Murmurs] : no murmurs [Heart Sounds Gallop] : no gallops [Heart Sounds Pericardial Friction Rub] : no pericardial rub [Bowel Sounds] : normal bowel sounds [Abdomen Tenderness] : non-tender [Abdomen Soft] : soft [] : no hepato-splenomegaly [Abdomen Mass (___ Cm)] : no abdominal mass palpated [Cervical Lymph Nodes Enlarged Anterior Bilaterally] : anterior cervical [Cervical Lymph Nodes Enlarged Posterior Bilaterally] : posterior cervical [Supraclavicular Lymph Nodes Enlarged Bilaterally] : supraclavicular [No CVA Tenderness] : no ~M costovertebral angle tenderness [No Spinal Tenderness] : no spinal tenderness

## 2020-07-02 ENCOUNTER — EMERGENCY (EMERGENCY)
Facility: HOSPITAL | Age: 55
LOS: 1 days | Discharge: HOME | End: 2020-07-02
Attending: EMERGENCY MEDICINE | Admitting: EMERGENCY MEDICINE
Payer: MEDICAID

## 2020-07-02 VITALS
DIASTOLIC BLOOD PRESSURE: 100 MMHG | SYSTOLIC BLOOD PRESSURE: 153 MMHG | RESPIRATION RATE: 18 BRPM | HEART RATE: 91 BPM | TEMPERATURE: 98 F | OXYGEN SATURATION: 100 %

## 2020-07-02 DIAGNOSIS — K46.9 UNSPECIFIED ABDOMINAL HERNIA WITHOUT OBSTRUCTION OR GANGRENE: Chronic | ICD-10-CM

## 2020-07-02 LAB
ALBUMIN SERPL ELPH-MCNC: 4.4 G/DL — SIGNIFICANT CHANGE UP (ref 3.3–5)
ALP SERPL-CCNC: 79 U/L — SIGNIFICANT CHANGE UP (ref 40–120)
ALT FLD-CCNC: 16 U/L — SIGNIFICANT CHANGE UP (ref 4–41)
ANION GAP SERPL CALC-SCNC: 12 MMO/L — SIGNIFICANT CHANGE UP (ref 7–14)
AST SERPL-CCNC: 22 U/L — SIGNIFICANT CHANGE UP (ref 4–40)
BASOPHILS # BLD AUTO: 0.06 K/UL — SIGNIFICANT CHANGE UP (ref 0–0.2)
BASOPHILS NFR BLD AUTO: 0.8 % — SIGNIFICANT CHANGE UP (ref 0–2)
BILIRUB SERPL-MCNC: 0.7 MG/DL — SIGNIFICANT CHANGE UP (ref 0.2–1.2)
BUN SERPL-MCNC: 14 MG/DL — SIGNIFICANT CHANGE UP (ref 7–23)
CALCIUM SERPL-MCNC: 9.4 MG/DL — SIGNIFICANT CHANGE UP (ref 8.4–10.5)
CHLORIDE SERPL-SCNC: 104 MMOL/L — SIGNIFICANT CHANGE UP (ref 98–107)
CO2 SERPL-SCNC: 26 MMOL/L — SIGNIFICANT CHANGE UP (ref 22–31)
CREAT SERPL-MCNC: 1.03 MG/DL — SIGNIFICANT CHANGE UP (ref 0.5–1.3)
EOSINOPHIL # BLD AUTO: 0.24 K/UL — SIGNIFICANT CHANGE UP (ref 0–0.5)
EOSINOPHIL NFR BLD AUTO: 3.4 % — SIGNIFICANT CHANGE UP (ref 0–6)
GLUCOSE SERPL-MCNC: 91 MG/DL — SIGNIFICANT CHANGE UP (ref 70–99)
HCT VFR BLD CALC: 44.3 % — SIGNIFICANT CHANGE UP (ref 39–50)
HGB BLD-MCNC: 14.9 G/DL — SIGNIFICANT CHANGE UP (ref 13–17)
IMM GRANULOCYTES NFR BLD AUTO: 0.3 % — SIGNIFICANT CHANGE UP (ref 0–1.5)
LACTATE SERPL-SCNC: 1 MMOL/L — SIGNIFICANT CHANGE UP (ref 0.5–2)
LIDOCAIN IGE QN: 29.9 U/L — SIGNIFICANT CHANGE UP (ref 7–60)
LYMPHOCYTES # BLD AUTO: 2.35 K/UL — SIGNIFICANT CHANGE UP (ref 1–3.3)
LYMPHOCYTES # BLD AUTO: 33.2 % — SIGNIFICANT CHANGE UP (ref 13–44)
MCHC RBC-ENTMCNC: 30.3 PG — SIGNIFICANT CHANGE UP (ref 27–34)
MCHC RBC-ENTMCNC: 33.6 % — SIGNIFICANT CHANGE UP (ref 32–36)
MCV RBC AUTO: 90 FL — SIGNIFICANT CHANGE UP (ref 80–100)
MONOCYTES # BLD AUTO: 0.55 K/UL — SIGNIFICANT CHANGE UP (ref 0–0.9)
MONOCYTES NFR BLD AUTO: 7.8 % — SIGNIFICANT CHANGE UP (ref 2–14)
NEUTROPHILS # BLD AUTO: 3.85 K/UL — SIGNIFICANT CHANGE UP (ref 1.8–7.4)
NEUTROPHILS NFR BLD AUTO: 54.5 % — SIGNIFICANT CHANGE UP (ref 43–77)
NRBC # FLD: 0 K/UL — SIGNIFICANT CHANGE UP (ref 0–0)
PLATELET # BLD AUTO: 235 K/UL — SIGNIFICANT CHANGE UP (ref 150–400)
PMV BLD: 10.8 FL — SIGNIFICANT CHANGE UP (ref 7–13)
POTASSIUM SERPL-MCNC: 4 MMOL/L — SIGNIFICANT CHANGE UP (ref 3.5–5.3)
POTASSIUM SERPL-SCNC: 4 MMOL/L — SIGNIFICANT CHANGE UP (ref 3.5–5.3)
PROT SERPL-MCNC: 6.9 G/DL — SIGNIFICANT CHANGE UP (ref 6–8.3)
RBC # BLD: 4.92 M/UL — SIGNIFICANT CHANGE UP (ref 4.2–5.8)
RBC # FLD: 12.3 % — SIGNIFICANT CHANGE UP (ref 10.3–14.5)
SODIUM SERPL-SCNC: 142 MMOL/L — SIGNIFICANT CHANGE UP (ref 135–145)
WBC # BLD: 7.07 K/UL — SIGNIFICANT CHANGE UP (ref 3.8–10.5)
WBC # FLD AUTO: 7.07 K/UL — SIGNIFICANT CHANGE UP (ref 3.8–10.5)

## 2020-07-02 PROCEDURE — 99285 EMERGENCY DEPT VISIT HI MDM: CPT

## 2020-07-02 PROCEDURE — 74177 CT ABD & PELVIS W/CONTRAST: CPT | Mod: 26

## 2020-07-02 RX ORDER — MORPHINE SULFATE 50 MG/1
4 CAPSULE, EXTENDED RELEASE ORAL ONCE
Refills: 0 | Status: DISCONTINUED | OUTPATIENT
Start: 2020-07-02 | End: 2020-07-02

## 2020-07-02 RX ORDER — SODIUM CHLORIDE 9 MG/ML
1000 INJECTION INTRAMUSCULAR; INTRAVENOUS; SUBCUTANEOUS ONCE
Refills: 0 | Status: COMPLETED | OUTPATIENT
Start: 2020-07-02 | End: 2020-07-02

## 2020-07-02 RX ADMIN — MORPHINE SULFATE 4 MILLIGRAM(S): 50 CAPSULE, EXTENDED RELEASE ORAL at 18:38

## 2020-07-02 RX ADMIN — SODIUM CHLORIDE 1000 MILLILITER(S): 9 INJECTION INTRAMUSCULAR; INTRAVENOUS; SUBCUTANEOUS at 18:38

## 2020-07-02 NOTE — ED PROVIDER NOTE - CLINICAL SUMMARY MEDICAL DECISION MAKING FREE TEXT BOX
56 Y/O M with hx LT inguinal hernia surgery presents to ER with RT LQ pain that began one week. CT scan to r/o appy. Labs to r/o infectious etiology. Will continue to monitor and manage pain 56 Y/O M with hx LT inguinal hernia surgery presents to ER with RT LQ pain that began one week. CT scan to r/o appy. Labs to r/o infectious etiology. Will continue to monitor and manage pain    Deepthi: pt comes complaining about a tearing pain in his right groin for the last week, with worsening pain and right lower quad pain and bulge.  exam shows non tender testicles, mild tenderness inn canal, but bulge above canal, reducible but painful.  will need ct/ lactate labs concerned for necrosis 54 Y/O M with hx LT inguinal hernia surgery presents to ER with RT LQ pain that began one week. CT scan to r/o appy and strangulated hernia. Labs to r/o infectious etiology. Will continue to monitor and manage pain    Deepthi: pt comes complaining about a tearing pain in his right groin for the last week, with worsening pain and right lower quad pain and bulge.  exam shows non tender testicles, mild tenderness inn canal, but bulge above canal, reducible but painful.  will need ct/ lactate labs concerned for necrosis

## 2020-07-02 NOTE — ED PROVIDER NOTE - ATTENDING CONTRIBUTION TO CARE
Deepthi: pt comes complaining about a tearing pain in his right groin for the last week, with worsening pain and right lower quad pain and bulge.  exam shows non tender testicles, mild tenderness inn canal, but bulge above canal, reducible but painful.  will need ct/ lactate labs concerned for necrosis    I performed a history and physical exam of the patient and discussed their management with the resident and /or advanced care provider. I reviewed the resident and /or ACP's note and agree with the documented findings and plan of care. My medical decison making and observations are found above.

## 2020-07-02 NOTE — ED PROVIDER NOTE - PATIENT PORTAL LINK FT
You can access the FollowMyHealth Patient Portal offered by [facility] by registering at the following website: [email or web address]. By joining [employer]’s FollowMyHealth portal, you will also be able to view your health information using other applications (apps) compatible with our system. You can access the FollowMyHealth Patient Portal offered by [facility] by registering at the following website: [email or web address]. By joining [employer]’s FollowMyHealth portal, you will also be able to view your health information using other applications (apps) compatible with our system.

## 2020-07-02 NOTE — ED ADULT TRIAGE NOTE - CHIEF COMPLAINT QUOTE
Pt states he was sent by PMD after a facetime eval for r/o hernia. Pt c/o pain and swelling to right lower abd, sometimes radiating to right leg. C/o increased discomfort with urination and increased urinary frequency. Denies burning on urination, denies back pain. Pt states he had similar swelling in the past on the left side.

## 2020-07-02 NOTE — ED ADULT NURSE NOTE - OBJECTIVE STATEMENT
pt received intake spot 1. pt A+Ox4 c/o right groin/ leg pain x 1 week worse today. states worse when urinating and bearing down during BM. denies pain when urinating pt appears uncomfortable. sent to ED by PMD for eval. respirations even and unlabored. IVSL in place. NS infusing. medicated as ordered. labs drawn and sent. awaiting CT scan. will monitor.

## 2020-07-02 NOTE — ED PROVIDER NOTE - PHYSICAL EXAMINATION
Vital signs reviewed.   CONSTITUTIONAL: Well-appearing; well-nourished; in no apparent distress. Non-toxic appearing.   HEAD: Normocephalic, atraumatic.  EYES: PERRL, EOM intact, conjunctiva and no sclera injection noted  CARD: Normal S1, S2  RESP: Normal chest excursion with respiration; breath sounds clear and equal bilaterally;  ABD/GI: normoactive bowel sounds, RLQ ttp  EXT/MS: moves all extremities; distal pulses are normal, no pedal edema.  SKIN: Normal for age and race; warm; dry; good turgor; no apparent lesions or exudate noted.  NEURO: Awake, alert, oriented x 3, no gross deficits, CN II-XII grossly intact, no motor or sensory deficit noted.  PSYCH: Normal mood; appropriate affect.

## 2020-07-02 NOTE — ED PROVIDER NOTE - NSFOLLOWUPINSTRUCTIONS_ED_ALL_ED_FT
Hernia    A hernia is when fat or the intestines push through a weak area in the abdominal wall. This can occur around the belly button (umbilical hernia) or where the leg meets the lower abdomen (inguinal hernia). This creates a rounded lump (bulge). Hernias that can be pushed back into the belly are called reducible and those that cannot are called incarcerated. Hernias that are not reducible and lose their blood supply are called strangulated and require emergency surgery. Hernias can be caused or worsened when straining during bowel movements or lifting heavy objects as well as coughing or sneezing. Surgery may be helpful in treating this condition so follow up with a surgeon.    SEEK IMMEDIATE MEDICAL CARE IF YOU HAVE ANY OF THE FOLLOWING SYMPTOMS: worsening abdominal pain, change in color over the hernia, nausea/vomiting, or inability to have a bowel movement or pass gas.    1) Follow up with General surgery for further evaluation  2) Please continue to take any home medications as prescribed. Take tylenol 325 mg every 4 hours for pain relief/fever control or ibuprofen 600 mg every 6 hours for pain relief/fever control  3) Your test results from your ED visit were discussed with you prior to discharge  4) You were provided with a copy of your test results Hernia    A hernia is when fat or the intestines push through a weak area in the abdominal wall. This can occur around the belly button (umbilical hernia) or where the leg meets the lower abdomen (inguinal hernia). This creates a rounded lump (bulge). Hernias that can be pushed back into the belly are called reducible and those that cannot are called incarcerated. Hernias that are not reducible and lose their blood supply are called strangulated and require emergency surgery. Hernias can be caused or worsened when straining during bowel movements or lifting heavy objects as well as coughing or sneezing. Surgery may be helpful in treating this condition so follow up with a surgeon.    SEEK IMMEDIATE MEDICAL CARE IF YOU HAVE ANY OF THE FOLLOWING SYMPTOMS: worsening abdominal pain, change in color over the hernia, nausea/vomiting, or inability to have a bowel movement or pass gas.    1) Follow up with General surgery for further evaluation - referral list provided  2) Please continue to take any home medications as prescribed. Take tylenol 325 mg every 4 hours for pain relief/fever control  3) Your test results from your ED visit were discussed with you prior to discharge  4) You were provided with a copy of your test results  5) Also recommend follow up with Gastroenterology for incidental gastritis found on CT - referral list provided

## 2020-07-02 NOTE — ED PROVIDER NOTE - OBJECTIVE STATEMENT
56 Y/O M with hx LT inguinal hernia surgery presents to ER with RT LQ pain that began one week. Experienced tearing sensation of RT groin following bearing down during bowel movement. Pain is made worse with urination and bowel movements. Reports some improvement with advil. This morning during shower experienced sharp pain of RT groin and has experienced worsening pain throughout the day. Facetime visit with PCP who recommended evaluation in ER. HX of LT groin hernia surgery 16 years ago. Denies fever, nausea, vomiting, diarrhea or chills

## 2020-07-03 ENCOUNTER — EMERGENCY (EMERGENCY)
Facility: HOSPITAL | Age: 55
LOS: 1 days | Discharge: HOME | End: 2020-07-03
Attending: STUDENT IN AN ORGANIZED HEALTH CARE EDUCATION/TRAINING PROGRAM | Admitting: STUDENT IN AN ORGANIZED HEALTH CARE EDUCATION/TRAINING PROGRAM
Payer: MEDICAID

## 2020-07-03 VITALS
RESPIRATION RATE: 16 BRPM | HEART RATE: 84 BPM | OXYGEN SATURATION: 99 % | TEMPERATURE: 98 F | SYSTOLIC BLOOD PRESSURE: 162 MMHG | DIASTOLIC BLOOD PRESSURE: 96 MMHG

## 2020-07-03 VITALS
TEMPERATURE: 98 F | DIASTOLIC BLOOD PRESSURE: 74 MMHG | SYSTOLIC BLOOD PRESSURE: 151 MMHG | RESPIRATION RATE: 16 BRPM | HEART RATE: 77 BPM | OXYGEN SATURATION: 99 %

## 2020-07-03 DIAGNOSIS — K46.9 UNSPECIFIED ABDOMINAL HERNIA WITHOUT OBSTRUCTION OR GANGRENE: Chronic | ICD-10-CM

## 2020-07-03 LAB
ALBUMIN SERPL ELPH-MCNC: 4.5 G/DL — SIGNIFICANT CHANGE UP (ref 3.3–5)
ALP SERPL-CCNC: 76 U/L — SIGNIFICANT CHANGE UP (ref 40–120)
ALT FLD-CCNC: 16 U/L — SIGNIFICANT CHANGE UP (ref 4–41)
ANION GAP SERPL CALC-SCNC: 13 MMO/L — SIGNIFICANT CHANGE UP (ref 7–14)
APPEARANCE UR: CLEAR — SIGNIFICANT CHANGE UP
APTT BLD: 38.9 SEC — HIGH (ref 27–36.3)
AST SERPL-CCNC: 17 U/L — SIGNIFICANT CHANGE UP (ref 4–40)
BASE EXCESS BLDV CALC-SCNC: 1.5 MMOL/L — SIGNIFICANT CHANGE UP
BASOPHILS # BLD AUTO: 0.08 K/UL — SIGNIFICANT CHANGE UP (ref 0–0.2)
BASOPHILS NFR BLD AUTO: 1.2 % — SIGNIFICANT CHANGE UP (ref 0–2)
BILIRUB SERPL-MCNC: 0.9 MG/DL — SIGNIFICANT CHANGE UP (ref 0.2–1.2)
BILIRUB UR-MCNC: NEGATIVE — SIGNIFICANT CHANGE UP
BLD GP AB SCN SERPL QL: NEGATIVE — SIGNIFICANT CHANGE UP
BLOOD GAS VENOUS - CREATININE: 1.03 MG/DL — SIGNIFICANT CHANGE UP (ref 0.5–1.3)
BLOOD UR QL VISUAL: NEGATIVE — SIGNIFICANT CHANGE UP
BUN SERPL-MCNC: 12 MG/DL — SIGNIFICANT CHANGE UP (ref 7–23)
CALCIUM SERPL-MCNC: 9.5 MG/DL — SIGNIFICANT CHANGE UP (ref 8.4–10.5)
CHLORIDE BLDV-SCNC: 106 MMOL/L — SIGNIFICANT CHANGE UP (ref 96–108)
CHLORIDE SERPL-SCNC: 103 MMOL/L — SIGNIFICANT CHANGE UP (ref 98–107)
CO2 SERPL-SCNC: 25 MMOL/L — SIGNIFICANT CHANGE UP (ref 22–31)
COLOR SPEC: SIGNIFICANT CHANGE UP
CREAT SERPL-MCNC: 0.91 MG/DL — SIGNIFICANT CHANGE UP (ref 0.5–1.3)
EOSINOPHIL # BLD AUTO: 0.24 K/UL — SIGNIFICANT CHANGE UP (ref 0–0.5)
EOSINOPHIL NFR BLD AUTO: 3.5 % — SIGNIFICANT CHANGE UP (ref 0–6)
GAS PNL BLDV: 140 MMOL/L — SIGNIFICANT CHANGE UP (ref 136–146)
GLUCOSE BLDV-MCNC: 102 MG/DL — HIGH (ref 70–99)
GLUCOSE SERPL-MCNC: 103 MG/DL — HIGH (ref 70–99)
GLUCOSE UR-MCNC: NEGATIVE — SIGNIFICANT CHANGE UP
HCO3 BLDV-SCNC: 25 MMOL/L — SIGNIFICANT CHANGE UP (ref 20–27)
HCT VFR BLD CALC: 42.7 % — SIGNIFICANT CHANGE UP (ref 39–50)
HCT VFR BLDV CALC: 47.9 % — SIGNIFICANT CHANGE UP (ref 39–51)
HGB BLD-MCNC: 14.7 G/DL — SIGNIFICANT CHANGE UP (ref 13–17)
HGB BLDV-MCNC: 15.6 G/DL — SIGNIFICANT CHANGE UP (ref 13–17)
IMM GRANULOCYTES NFR BLD AUTO: 0.6 % — SIGNIFICANT CHANGE UP (ref 0–1.5)
INR BLD: 1.02 — SIGNIFICANT CHANGE UP (ref 0.88–1.17)
KETONES UR-MCNC: NEGATIVE — SIGNIFICANT CHANGE UP
LACTATE BLDV-MCNC: 2.2 MMOL/L — HIGH (ref 0.5–2)
LEUKOCYTE ESTERASE UR-ACNC: NEGATIVE — SIGNIFICANT CHANGE UP
LYMPHOCYTES # BLD AUTO: 2.09 K/UL — SIGNIFICANT CHANGE UP (ref 1–3.3)
LYMPHOCYTES # BLD AUTO: 30.5 % — SIGNIFICANT CHANGE UP (ref 13–44)
MCHC RBC-ENTMCNC: 30.5 PG — SIGNIFICANT CHANGE UP (ref 27–34)
MCHC RBC-ENTMCNC: 34.4 % — SIGNIFICANT CHANGE UP (ref 32–36)
MCV RBC AUTO: 88.6 FL — SIGNIFICANT CHANGE UP (ref 80–100)
MONOCYTES # BLD AUTO: 0.6 K/UL — SIGNIFICANT CHANGE UP (ref 0–0.9)
MONOCYTES NFR BLD AUTO: 8.8 % — SIGNIFICANT CHANGE UP (ref 2–14)
NEUTROPHILS # BLD AUTO: 3.8 K/UL — SIGNIFICANT CHANGE UP (ref 1.8–7.4)
NEUTROPHILS NFR BLD AUTO: 55.4 % — SIGNIFICANT CHANGE UP (ref 43–77)
NITRITE UR-MCNC: NEGATIVE — SIGNIFICANT CHANGE UP
NRBC # FLD: 0 K/UL — SIGNIFICANT CHANGE UP (ref 0–0)
PCO2 BLDV: 47 MMHG — SIGNIFICANT CHANGE UP (ref 41–51)
PH BLDV: 7.37 PH — SIGNIFICANT CHANGE UP (ref 7.32–7.43)
PH UR: 6 — SIGNIFICANT CHANGE UP (ref 5–8)
PLATELET # BLD AUTO: 217 K/UL — SIGNIFICANT CHANGE UP (ref 150–400)
PMV BLD: 10.9 FL — SIGNIFICANT CHANGE UP (ref 7–13)
PO2 BLDV: 41 MMHG — HIGH (ref 35–40)
POTASSIUM BLDV-SCNC: 3.8 MMOL/L — SIGNIFICANT CHANGE UP (ref 3.4–4.5)
POTASSIUM SERPL-MCNC: 3.9 MMOL/L — SIGNIFICANT CHANGE UP (ref 3.5–5.3)
POTASSIUM SERPL-SCNC: 3.9 MMOL/L — SIGNIFICANT CHANGE UP (ref 3.5–5.3)
PROT SERPL-MCNC: 6.9 G/DL — SIGNIFICANT CHANGE UP (ref 6–8.3)
PROT UR-MCNC: NEGATIVE — SIGNIFICANT CHANGE UP
PROTHROM AB SERPL-ACNC: 11.7 SEC — SIGNIFICANT CHANGE UP (ref 9.8–13.1)
RBC # BLD: 4.82 M/UL — SIGNIFICANT CHANGE UP (ref 4.2–5.8)
RBC # FLD: 12.2 % — SIGNIFICANT CHANGE UP (ref 10.3–14.5)
RH IG SCN BLD-IMP: POSITIVE — SIGNIFICANT CHANGE UP
SAO2 % BLDV: 72.7 % — SIGNIFICANT CHANGE UP (ref 60–85)
SODIUM SERPL-SCNC: 141 MMOL/L — SIGNIFICANT CHANGE UP (ref 135–145)
SP GR SPEC: 1.02 — SIGNIFICANT CHANGE UP (ref 1–1.04)
UROBILINOGEN FLD QL: NORMAL — SIGNIFICANT CHANGE UP
WBC # BLD: 6.85 K/UL — SIGNIFICANT CHANGE UP (ref 3.8–10.5)
WBC # FLD AUTO: 6.85 K/UL — SIGNIFICANT CHANGE UP (ref 3.8–10.5)

## 2020-07-03 PROCEDURE — 74177 CT ABD & PELVIS W/CONTRAST: CPT | Mod: 26

## 2020-07-03 PROCEDURE — 99285 EMERGENCY DEPT VISIT HI MDM: CPT

## 2020-07-03 RX ORDER — ACETAMINOPHEN 500 MG
650 TABLET ORAL ONCE
Refills: 0 | Status: COMPLETED | OUTPATIENT
Start: 2020-07-03 | End: 2020-07-03

## 2020-07-03 RX ORDER — MORPHINE SULFATE 50 MG/1
4 CAPSULE, EXTENDED RELEASE ORAL ONCE
Refills: 0 | Status: DISCONTINUED | OUTPATIENT
Start: 2020-07-03 | End: 2020-07-03

## 2020-07-03 RX ORDER — OXYCODONE HYDROCHLORIDE 5 MG/1
1 TABLET ORAL
Qty: 6 | Refills: 0
Start: 2020-07-03 | End: 2020-07-08

## 2020-07-03 RX ORDER — OXYCODONE HYDROCHLORIDE 5 MG/1
1 TABLET ORAL
Qty: 6 | Refills: 0
Start: 2020-07-03 | End: 2020-07-05

## 2020-07-03 RX ADMIN — MORPHINE SULFATE 4 MILLIGRAM(S): 50 CAPSULE, EXTENDED RELEASE ORAL at 09:11

## 2020-07-03 RX ADMIN — Medication 650 MILLIGRAM(S): at 14:54

## 2020-07-03 NOTE — ED ADULT NURSE NOTE - DISCHARGE DATE/TIME
Assessment  1  Benign essential hypertension (401 1) (I10)   2  Combined hyperlipidemia (272 2) (E78 2)   3  Factor V Leiden mutation (289 81) (D68 51)   4  Asthma (493 90) (J45 909)   5  Long term current use of anticoagulant (V58 61) (Z79 01)    Plan   Asthma    · Ventolin  (90 Base) MCG/ACT Inhalation Aerosol Solution; INHALE ONE  OR TWO PUFFS BY MOUTH EVERY FOUR TO SIX HOURS AS NEEDED  Factor V Leiden mutation    · Jantoven 1 MG Oral Tablet; TAKE 2 TABLETs BY MOUTH Mon and Fri  AND 3  TABLETs  ALL OTHER DAYS   · (1) PT WITH INR; Status:Active; Requested for:18Oct2017;   Flu vaccine need    · Fluzone High-Dose 0 5 ML Intramuscular Suspension Prefilled Syringe    LORazepam 1 MG Oral Tablet; TAKE 1 TABLET EVERY 12 HOURS as needed; Therapy: 61OGY7127 to (Evaluate:32Vzs3445)  Requested for: 25NCB3050; Last Rx:18Oct2017; Status: ACTIVE Ordered  Rx By: Sona Calderon; Dispense: 30 Days ; #:60 Tablet; Refill: 1;   For: Anxiety; VIKY = N; Call Rx     Annotations              4/16/2016 Rx was called in to [address] [place] [address] called to [address]  [facility] per patient request               called to [facility] on [address] per pt request  1/21/2015  EW     Discussion/Summary    Patient is a 78year old female with hyperlipidemia  We discussed her blood work - chol 152 and glucose was 89 - very good  Patient has Factor V She is due for another INR  I renewed her Lorazepam and Ventolin  She was given a flu shot  Possible side effects of new medications were reviewed with the patient/guardian today  The treatment plan was reviewed with the patient/guardian  The patient/guardian understands and agrees with the treatment plan      Chief Complaint  pt here for her f/u to her chronic conditions and to review labs   Patient is here today for follow up of chronic conditions described in HPI  History of Present Illness    Her last LDL was 59 mg/dL  Her hypertension is primary, but-- stable   the patient is adherent with her medication regimen  She has no significant interval events  Symptoms: The patient denies any symptoms currently  Lifestyle and Disease Management:       Patient is here for hypertension and hyperlipidemia  She is taking Coumadin  drove herself t the office  Review of Systems    Constitutional: No fever, no chills, feels well, no tiredness, no recent weight gain or weight loss  Cardiovascular: No complaints of slow heart rate, no fast heart rate, no chest pain, no palpitations, no leg claudication, no lower extremity edema  Respiratory: No complaints of shortness of breath, no wheezing, no cough, no SOB on exertion, no orthopnea, no PND  Gastrointestinal: No complaints of abdominal pain, no constipation, no nausea or vomiting, no diarrhea, no bloody stools  Musculoskeletal: arthralgias  Neurological: No complaints of headache, no confusion, no convulsions, no numbness, no dizziness or fainting, no tingling, no limb weakness, no difficulty walking  Active Problems  1  Abnormal glucose (790 29) (R73 09)   2  Abnormal weight loss (783 21) (R63 4)   3  Allergic urticaria (708 0) (L50 0)   4  Anxiety (300 00) (F41 9)   5  Asthma (493 90) (J45 909)   6  Benign essential hypertension (401 1) (I10)   7  Bilateral carpal tunnel syndrome (354 0) (G56 03)   8  Combined hyperlipidemia (272 2) (E78 2)   9  Dizziness (780 4) (R42)   10  Esophagitis (530 10) (K20 9)   11  Factor V Leiden mutation (289 81) (D68 51)   12  Generalized osteoarthritis (715 00) (M15 9)   13  Long term current use of anticoagulant (V58 61) (Z79 01)   14  Numbness of fingers of both hands (782 0) (R20 0)    Past Medical History  1  History of Abnormal blood chemistry (790 6) (R79 9)   2  History of Breast Cancer (V10 3)   3  History of Breast cancer screening, high risk patient (V76 11) (Z12 31)   4  History of Depression screening (V79 0) (Z13 89)   5  History of Dysuria (788 1) (R30 0)   6   History of Dysuria (788 1) (R30 0)   7  History of Gait disturbance (781 2) (R26 9)   8  History of Herpes zoster (053 9) (B02 9)   9  History of edema (V13 89) (Z87 898)   10  History of fatigue (V13 89) (Z87 898)   11  History of herpes zoster (V12 09) (Z86 19)   12  History of influenza vaccination (V49 89) (Z92 29)   13  History of screening mammography (V15 89) (Z92 89)   14  History of Impacted cerumen of right ear (380 4) (H61 21)   15  History of Lower back pain (724 2) (M54 5)   16  History of Malignant neoplasm of breast, unspecified laterality   17  History of Medicare annual wellness visit, subsequent (V70 0) (Z00 00)   18  History of Need for prophylactic vaccination and inoculation against influenza (V04 81)    (Z23)   19  History of Open wound of ankle (891 0) (S91 009A)   20  History of Pain in joint, lower leg (719 46) (M25 569)   21  History of Pain, joint, hip, right (719 45) (M25 551)   22  Personal history of urinary tract infection (V13 02) (Z87 440)   23  History of Pulmonary Embolism (V12 51)   24  History of Right ankle pain (719 47) (M25 571)   25  History of Screening for depression (V79 0) (Z13 89)   26  History of Screening for osteoporosis (V82 81) (Z13 820)   27  History of Screening for other and unspecified genitourinary condition (V81 6) (Z13 89)   28  History of Screening for other and unspecified genitourinary condition (V81 6) (Z13 89)   29  History of Special screening for other neurological conditions (V80 09) (Z13 89)   30  History of Special screening for other neurological conditions (V80 09) (Z13 89)    The active problems and past medical history were reviewed and updated today  Surgical History  1  Denied: History Of Prior Surgery    Family History  Mother    1  Family history of diabetes mellitus (V18 0) (Z83 3)  Daughter    2  Family history of Agoraphobia   3  Family history of hypertension (V17 49) (Z82 49)  Family History    4  Family history of Cervical Cancer   5   Family history of Emphysema    Social History   · Former smoker (Q15 61) (O18 569)  The social history was reviewed and updated today  The social history was reviewed and is unchanged  Current Meds   1  Atorvastatin Calcium 20 MG Oral Tablet; TAKE 1 TABLET DAILY AS DIRECTED; Therapy: 51Cfw1251 to (Evaluate:40Tph6543)  Requested for: 29LLX5299; Last   Rx:07Jun2017 Ordered   2  Diclofenac Sodium 1 % Transdermal Gel; Apply 2 gr to each knee twice a day; Therapy: 71QPH8577 to (Last Rx:08Xgy5007) Ordered   3  Diclofenac Sodium 1 % Transdermal Gel; apply 2 grams to each knee twice daily; Therapy: 57KHG9172 to (Evaluate:54Vtc1971)  Requested for: 19Pbc0286; Last   Rx:48Dqg2003; Status: ACTIVE - Transmit to Pharmacy - Awaiting Verification Ordered   4  Flovent  MCG/ACT Inhalation Aerosol; INHALE TWO PUFFS BY MOUTH TWICE   DAILY; Therapy: 27DBT3589 to (Evaluate:10Aug2017)  Requested for: 50NMQ9379; Last   Rx:33Bir6101; Status: ACTIVE - Transmit to Pharmacy - Awaiting Verification Ordered   5  Jantoven 1 MG Oral Tablet; TAKE 2 TABLET BY MOUTH M,W+F AND 1 TABLET ALL   OTHER DAYS; Therapy: 99Asf8521 to (Evaluate:79Zyt5002)  Requested for: 20Lwn6582; Last   Rx:94Qho3441; Status: ACTIVE - Transmit to Pharmacy - Awaiting Verification Ordered   6  Lisinopril 10 MG Oral Tablet; TAKE ONE TABLET BY MOUTH ONCE DAILY AS DIRECTED; Therapy: 64Nnj1210 to (Evaluate:11Emq4907)  Requested for: 63Nql3912; Last   Rx:07Phj8605; Status: ACTIVE - Transmit to [facility] Verification Ordered   7  LORazepam 1 MG Oral Tablet; TAKE 1 TABLET EVERY 12 HOURS as needed; Therapy: 92EFH4451 to (Sol Barrio)  Requested for: 71Hvd7386; Last   Rx:02Grd2388 Ordered   8  Serevent Diskus 50 MCG/DOSE Inhalation Aerosol Powder Breath Activated; INHALE   ONE PUFF BY MOUTH EVERY TWELVE HOURS; Therapy: 70SQC8261 to (Evaluate:04Tow6089)  Requested for: 32ELY6216; Last   Rx:02Mar2017 Ordered   9   TraMADol HCl - 50 MG Oral Tablet; TAKE ONE TABLET BY MOUTH IN THE EVENING AS   NEEDED; Therapy: 20AOZ3646 to (Evaluate:19Oct2017)  Requested for: 86Fkd8285; Last   Rx:85Jto9462 Ordered   10  Ventolin  (90 Base) MCG/ACT Inhalation Aerosol Solution; INHALE ONE OR TWO    PUFFS BY MOUTH EVERY FOUR TO SIX HOURS AS NEEDED; Therapy: 72JFN0884 to (Ernie Cancel)  Requested for: 38NRY2943; Last    Rx:87Icb9593; Status: ACTIVE - Transmit to [facility] Verification Ordered    The medication list was reviewed and updated today  Allergies  1  Ciprofloxacin HCl TABS   2  Erythromycin TABS   3  Sulfa Drugs  4  Animal dander - Cats   5  Animal dander - Dogs   6  Dust Mite   7  Ragweed    Vitals  Vital Signs    Recorded: 15RQU1653 01:23PM   Temperature 97 4 F, Tympanic   Heart Rate 108   Pulse Quality Normal   Respiration Quality Normal   Respiration 17   Systolic 359, RUE, Sitting   Diastolic 70, RUE, Sitting   Height 5 ft 3 in   Weight 131 lb 9 oz   BMI Calculated 23 31   BSA Calculated 1 62   O2 Saturation 97   Pain Scale 0     Physical Exam    Constitutional   General appearance: No acute distress, well appearing and well nourished  Pulmonary   Respiratory effort: No increased work of breathing or signs of respiratory distress  Auscultation of lungs: Clear to auscultation  Cardiovascular   Auscultation of heart: Normal rate and rhythm, normal S1 and S2, without murmurs  Examination of extremities for edema and/or varicosities: Abnormal   varicosities noted bilaterally  Carotid pulses: Normal     Lymphatic   Palpation of lymph nodes in neck: No lymphadenopathy  Musculoskeletal   Gait and station: Normal     Inspection/palpation of joints, bones, and muscles: Normal     Psychiatric   Orientation to person, place, and time: Normal     Mood and affect: Normal          Health Management  History of Malignant neoplasm of breast, unspecified laterality   Digital Bilateral Screening Mammogram With CAD; every 1 year; Last 81KZN9051;  Next Due:  69AZZ9305;  Overdue    Signatures   Electronically signed by : Natividad Woodson DO; Nov 1 2017  2:45AM EST                       (Author) 03-Jul-2020 16:37

## 2020-07-03 NOTE — ED PROVIDER NOTE - NSFOLLOWUPINSTRUCTIONS_ED_ALL_ED_FT
Hernia    A hernia is when fat or the intestines push through a weak area in the abdominal wall. This can occur around the belly button (umbilical hernia) or where the leg meets the lower abdomen (inguinal hernia). This creates a rounded lump (bulge). Hernias that can be pushed back into the belly are called reducible and those that cannot are called incarcerated. Hernias that are not reducible and lose their blood supply are called strangulated and require emergency surgery. Hernias can be caused or worsened when straining during bowel movements or lifting heavy objects as well as coughing or sneezing.     Please follow the instructions given to you by the discharge center.    You may use tylenol for pain. Please do not exceed 3250 mg in 24 hours. If you have break through pain you may use oxycodone. Do not operate machinery while taking this.     SEEK IMMEDIATE MEDICAL CARE IF YOU HAVE ANY OF THE FOLLOWING SYMPTOMS: worsening abdominal pain, change in color over the hernia, nausea/vomiting, or inability to have a bowel movement or pass gas.

## 2020-07-03 NOTE — ED ADULT TRIAGE NOTE - CHIEF COMPLAINT QUOTE
pt c/o R sided abdominal pain x1 week, seen here yesterday evening for same symptoms and Dx with Hernia and d/c to follow up with outpatient surgery and GI. pt returned because "the pain is bad". did not take any meds for pain at home.

## 2020-07-03 NOTE — ED PROVIDER NOTE - PROGRESS NOTE DETAILS
Joseph Frankel PGY2: Patient's pain continuing despite pain medications. Will rescan. Will continue to reassess. Joseph Frankel PGY2: Patients pain better. CT negative. Will get discharge center involved to schedule close outpatient followup. Patient feels comfortable going home. Discussed plan and return precautions with patient who understands and agrees. All questions answered.

## 2020-07-03 NOTE — ED PROVIDER NOTE - PHYSICAL EXAMINATION
Gen: Alert and oriented. Lying comfortably in bed. Answering questions appropriately  HEENT: extra occular movements intact, no nasal discharge, mucous membranes moist  CV: Regular rate and rhythm, +S1/S2, no murmurs/rubs/gallops,   Resp: Clear to ausculation bilaterally, no wheezes/rhonchi/rales  GI: Abdomen soft non-distended, Tender in the RLQ/right suprapubic area with small bulge that is not red or edematous. no masses   (chaperoned Dr. Liriano): No bulge in inguinal canal  MSK: No open wounds, no bruising, no LE edema, Homans sign negative bl  Neuro: A&Ox4, following commands, moving all four extremities spontaneously  Psych: appropriate mood

## 2020-07-03 NOTE — ED PROVIDER NOTE - PATIENT PORTAL LINK FT
You can access the FollowMyHealth Patient Portal offered by [facility] by registering at the following website: [email or web address]. By joining [employer]’s FollowMyHealth portal, you will also be able to view your health information using other applications (apps) compatible with our system.

## 2020-07-03 NOTE — ED ADULT NURSE NOTE - OBJECTIVE STATEMENT
Pt c/o pain to R groin. As per pt, has been having pain to R groin this past week after having a bowel movement. Was seen in ER yesterday and dx with R inguinal hernia as per pt. States that when he lays flat, hernia reduces but with movement, hernia protrudes and is more painful. Pt also having pain on urination. Appears uncomfortable when ambulating. Evaluated by MD. Pending labs. Will continue to monitor

## 2020-07-03 NOTE — ED PROVIDER NOTE - OBJECTIVE STATEMENT
56 yo M with previous left hernia surgery many years ago presenting for RLQ/right inguinal pain. Patient states that earlier this week he was taking a BM when he felt a tearing pain in the right lower groin. At first, pain tolerable but then it got worse so patient presented to ER yesterday where his labs were wnl and CT scan wnl. Was discharged home with pain management. Returns this morning because states pain is worse. 10/10. Non radiating. Tearing in nature. Has taken 2 aspirin with no relief. Worse when he stands up and walks. Denies n/v, fever, chills. Passing gas.

## 2020-07-03 NOTE — ED PROVIDER NOTE - ATTENDING CONTRIBUTION TO CARE
GEN: no acute respiratory distress. nontoxic, in pain speaking comfortably in full sentences  HEENT: NCAT. face symmetrical. PERRL 4mm, EOMI, MMM, oropharynx wnl.  Neck: no JVD, trachea midline, no LAD  CV: RRR. +S1S2, no murmur. 2+ pulses in 4 extremities, cap refill <2 sec  Chest: CTA B/l. no wheezing, rales, rhonchi. no retractions. good air movement. no tenderness.   ABD: +BS, soft, non distended, right-sided tenderness. No guarding/rebound. No lesions, ecchymosis, well healed LLQ surgical scar. right inguinal bulge +tenderness, reduced with moderate pressure.   :[chaperon dr j frankel ] no cva tenderness. no testicular/penile tenderness, scrotum wnl.  MSK: No clubbing, cyanosis, edema. FROM of all extremities. no tenderness to palpation. No midline or paraspinal tenderness.  Neuro: AAOX3. Sensation intact, motor 5/5 throughout.  SKIN: No rash   54 yo M past medical history left inguinal repair with right inguinal bulge and pain. ?pain with urination. bulge and pain increased with BM/urination. last BM today and normal. no fever, chills, cp/sob, cough, back pain. recent visit to ED reviewed. high suspicion for inguinal hernia now s/p reduction. plan: labs, symptom relief, reassess

## 2020-07-03 NOTE — ED PROVIDER NOTE - CLINICAL SUMMARY MEDICAL DECISION MAKING FREE TEXT BOX
Joseph Frankel PGY2: 56 yo M with second visit for RLQ pain. VSS. Patient looks well and is non toxic appearing. PE as above. Most likely hernia. Was able to reduce the hernia with minimal effort. Patient clinically with no strangulation. However patient's pain worsening. Will get labs, pain control, and reassess patient closely and will consider need for repeat CT.

## 2020-07-04 LAB
CULTURE RESULTS: SIGNIFICANT CHANGE UP
SPECIMEN SOURCE: SIGNIFICANT CHANGE UP

## 2020-07-06 ENCOUNTER — INPATIENT (INPATIENT)
Facility: HOSPITAL | Age: 55
LOS: 0 days | Discharge: HOME | End: 2020-07-07
Attending: SPECIALIST | Admitting: SPECIALIST
Payer: MEDICAID

## 2020-07-06 ENCOUNTER — TRANSCRIPTION ENCOUNTER (OUTPATIENT)
Age: 55
End: 2020-07-06

## 2020-07-06 VITALS
OXYGEN SATURATION: 99 % | HEART RATE: 87 BPM | DIASTOLIC BLOOD PRESSURE: 89 MMHG | SYSTOLIC BLOOD PRESSURE: 142 MMHG | TEMPERATURE: 98 F | RESPIRATION RATE: 16 BRPM

## 2020-07-06 DIAGNOSIS — K40.90 UNILATERAL INGUINAL HERNIA, WITHOUT OBSTRUCTION OR GANGRENE, NOT SPECIFIED AS RECURRENT: ICD-10-CM

## 2020-07-06 DIAGNOSIS — Z98.890 OTHER SPECIFIED POSTPROCEDURAL STATES: Chronic | ICD-10-CM

## 2020-07-06 LAB
ALBUMIN SERPL ELPH-MCNC: 4.4 G/DL — SIGNIFICANT CHANGE UP (ref 3.3–5)
ALP SERPL-CCNC: 79 U/L — SIGNIFICANT CHANGE UP (ref 40–120)
ALT FLD-CCNC: 18 U/L — SIGNIFICANT CHANGE UP (ref 4–41)
ANION GAP SERPL CALC-SCNC: 11 MMO/L — SIGNIFICANT CHANGE UP (ref 7–14)
APTT BLD: 35.5 SEC — SIGNIFICANT CHANGE UP (ref 27–36.3)
AST SERPL-CCNC: 20 U/L — SIGNIFICANT CHANGE UP (ref 4–40)
BASOPHILS # BLD AUTO: 0.09 K/UL — SIGNIFICANT CHANGE UP (ref 0–0.2)
BASOPHILS NFR BLD AUTO: 1.3 % — SIGNIFICANT CHANGE UP (ref 0–2)
BILIRUB SERPL-MCNC: 0.5 MG/DL — SIGNIFICANT CHANGE UP (ref 0.2–1.2)
BLD GP AB SCN SERPL QL: NEGATIVE — SIGNIFICANT CHANGE UP
BUN SERPL-MCNC: 13 MG/DL — SIGNIFICANT CHANGE UP (ref 7–23)
CALCIUM SERPL-MCNC: 9.2 MG/DL — SIGNIFICANT CHANGE UP (ref 8.4–10.5)
CHLORIDE SERPL-SCNC: 105 MMOL/L — SIGNIFICANT CHANGE UP (ref 98–107)
CO2 SERPL-SCNC: 25 MMOL/L — SIGNIFICANT CHANGE UP (ref 22–31)
CREAT SERPL-MCNC: 0.96 MG/DL — SIGNIFICANT CHANGE UP (ref 0.5–1.3)
EOSINOPHIL # BLD AUTO: 0.24 K/UL — SIGNIFICANT CHANGE UP (ref 0–0.5)
EOSINOPHIL NFR BLD AUTO: 3.6 % — SIGNIFICANT CHANGE UP (ref 0–6)
GLUCOSE SERPL-MCNC: 95 MG/DL — SIGNIFICANT CHANGE UP (ref 70–99)
HCT VFR BLD CALC: 42.6 % — SIGNIFICANT CHANGE UP (ref 39–50)
HGB BLD-MCNC: 14.3 G/DL — SIGNIFICANT CHANGE UP (ref 13–17)
IMM GRANULOCYTES NFR BLD AUTO: 0.3 % — SIGNIFICANT CHANGE UP (ref 0–1.5)
INR BLD: 1.05 — SIGNIFICANT CHANGE UP (ref 0.88–1.17)
LYMPHOCYTES # BLD AUTO: 1.89 K/UL — SIGNIFICANT CHANGE UP (ref 1–3.3)
LYMPHOCYTES # BLD AUTO: 28 % — SIGNIFICANT CHANGE UP (ref 13–44)
MCHC RBC-ENTMCNC: 30.2 PG — SIGNIFICANT CHANGE UP (ref 27–34)
MCHC RBC-ENTMCNC: 33.6 % — SIGNIFICANT CHANGE UP (ref 32–36)
MCV RBC AUTO: 90.1 FL — SIGNIFICANT CHANGE UP (ref 80–100)
MONOCYTES # BLD AUTO: 0.54 K/UL — SIGNIFICANT CHANGE UP (ref 0–0.9)
MONOCYTES NFR BLD AUTO: 8 % — SIGNIFICANT CHANGE UP (ref 2–14)
NEUTROPHILS # BLD AUTO: 3.98 K/UL — SIGNIFICANT CHANGE UP (ref 1.8–7.4)
NEUTROPHILS NFR BLD AUTO: 58.8 % — SIGNIFICANT CHANGE UP (ref 43–77)
NRBC # FLD: 0 K/UL — SIGNIFICANT CHANGE UP (ref 0–0)
PLATELET # BLD AUTO: 228 K/UL — SIGNIFICANT CHANGE UP (ref 150–400)
PMV BLD: 10.8 FL — SIGNIFICANT CHANGE UP (ref 7–13)
POTASSIUM SERPL-MCNC: 4 MMOL/L — SIGNIFICANT CHANGE UP (ref 3.5–5.3)
POTASSIUM SERPL-SCNC: 4 MMOL/L — SIGNIFICANT CHANGE UP (ref 3.5–5.3)
PROT SERPL-MCNC: 6.7 G/DL — SIGNIFICANT CHANGE UP (ref 6–8.3)
PROTHROM AB SERPL-ACNC: 12 SEC — SIGNIFICANT CHANGE UP (ref 9.8–13.1)
RBC # BLD: 4.73 M/UL — SIGNIFICANT CHANGE UP (ref 4.2–5.8)
RBC # FLD: 12.1 % — SIGNIFICANT CHANGE UP (ref 10.3–14.5)
RH IG SCN BLD-IMP: POSITIVE — SIGNIFICANT CHANGE UP
SARS-COV-2 RNA SPEC QL NAA+PROBE: SIGNIFICANT CHANGE UP
SODIUM SERPL-SCNC: 141 MMOL/L — SIGNIFICANT CHANGE UP (ref 135–145)
WBC # BLD: 6.76 K/UL — SIGNIFICANT CHANGE UP (ref 3.8–10.5)
WBC # FLD AUTO: 6.76 K/UL — SIGNIFICANT CHANGE UP (ref 3.8–10.5)

## 2020-07-06 PROCEDURE — 71045 X-RAY EXAM CHEST 1 VIEW: CPT | Mod: 26

## 2020-07-06 PROCEDURE — 99285 EMERGENCY DEPT VISIT HI MDM: CPT

## 2020-07-06 RX ORDER — OXYCODONE HYDROCHLORIDE 5 MG/1
5 TABLET ORAL ONCE
Refills: 0 | Status: DISCONTINUED | OUTPATIENT
Start: 2020-07-06 | End: 2020-07-07

## 2020-07-06 RX ORDER — SODIUM CHLORIDE 9 MG/ML
1000 INJECTION, SOLUTION INTRAVENOUS
Refills: 0 | Status: DISCONTINUED | OUTPATIENT
Start: 2020-07-06 | End: 2020-07-07

## 2020-07-06 RX ORDER — ENOXAPARIN SODIUM 100 MG/ML
40 INJECTION SUBCUTANEOUS DAILY
Refills: 0 | Status: DISCONTINUED | OUTPATIENT
Start: 2020-07-06 | End: 2020-07-07

## 2020-07-06 RX ORDER — ACETAMINOPHEN 500 MG
1000 TABLET ORAL EVERY 6 HOURS
Refills: 0 | Status: COMPLETED | OUTPATIENT
Start: 2020-07-06 | End: 2020-07-07

## 2020-07-06 RX ADMIN — Medication 400 MILLIGRAM(S): at 23:15

## 2020-07-06 RX ADMIN — Medication 400 MILLIGRAM(S): at 18:20

## 2020-07-06 RX ADMIN — SODIUM CHLORIDE 125 MILLILITER(S): 9 INJECTION, SOLUTION INTRAVENOUS at 16:03

## 2020-07-06 NOTE — ED ADULT TRIAGE NOTE - CHIEF COMPLAINT QUOTE
Pt told by surgeon to go to the ER.  Says he's scheduled for hernia surgery in a few hours.  C/O right lower quadrant pain x2 weeks.

## 2020-07-06 NOTE — H&P ADULT - NSHPPHYSICALEXAM_GEN_ALL_CORE
PHYSICAL EXAM:  Constitutional: well developed, well nourished, NAD  Eyes: anicteric  ENMT: normal facies, symmetric  Respiratory: Breathing comfortably    Gastrointestinal: abdomen soft, nontender, nondistended. right inguinal hernia palpated, soft, mildly tender, no signs of skin changes or erythema   Extremities: FROM, warm  Neurological: intact, non-focal  Psychiatric: oriented x 3; appropriate

## 2020-07-06 NOTE — ED PROVIDER NOTE - OBJECTIVE STATEMENT
56 y/o M w/ history of L inguinal hernia repair, presents today for persistent R inguinal hernia and for evaluation by surgery. Pt states he was called and told to come to the hospital for a surgical repair today. Pt reports he drank coffee at 8AM and last ate yesterday. Pt had normal bowel movement this morning and normal urination earlier today. Denies any abdominal pain, nausea, vomiting, fever, or chills.

## 2020-07-06 NOTE — H&P ADULT - ASSESSMENT
55 year old male with past surgical history of open left inguinal hernia repair and no significant past medical history, presents with Right inguinal hernia    - Admit to Dr. Weinstein  - Added on to OR schedule tomorrow  - NPO after midnight  - Pre-op labs ordered  - COVID swab pending   - Discussed with Dr. Weinstein    [id number] 55 year old male with past surgical history of open left inguinal hernia repair and no significant past medical history, presents with Right inguinal hernia    - Admit to Dr. Weinstein  - Added on to OR schedule tomorrow  - NPO after midnight  - Pre-op labs ordered  - COVID swab pending   - Discussed with Dr. Corinna WILLIAM team surgery [id number] 55 year old male with past surgical history of open left inguinal hernia repair and no significant past medical history, presents with Right inguinal hernia    - Admit to Dr. Weinstein  - Consent in chart  - Added on to OR schedule tomorrow  - NPO after midnight  - Pre-op labs ordered  - COVID swab pending   - Discussed with Dr. Corinna WILLIAM team surgery [id number]

## 2020-07-06 NOTE — ED ADULT NURSE NOTE - OBJECTIVE STATEMENT
Pt state he has a RT sided inguinal hernia that he noticed 2 days ago, but thinks its 2 weeks old. Pt states he is a  and does heavy lifting daily. Pt denies pain at present, but feels it when he coughs or sneezes. Pt states he last ate at 0800 and has coffee, denies taking any medications or known PMH, Pt states he has a hx of hernia repair on the LT side in the past. PIV inserted with aseptic technique, blood drawn, labeled at bedside with 2 pt identifiers and sent to lab. Pt aware of POC, NAD. IVF started per order.

## 2020-07-06 NOTE — ED PROVIDER NOTE - CLINICAL SUMMARY MEDICAL DECISION MAKING FREE TEXT BOX
54 y/o M w/ R inguinal hernia. Normal bowel function. Hernia is reducible. Will reach out to surgery team for further recommendations.

## 2020-07-06 NOTE — ED ADULT NURSE NOTE - NS ED NOTE ABUSE SUSPICION NEGLECT YN
[de-identified] : Discussed at length the nature of the patients condition. Their right knee symptoms appear secondary to a possible torn medial meniscus underlying the patellofemoral joint. Suggested we obtain an MRI of the right knee to evaluate interarticular pathology. When results are available, we will discuss further. In the interim, I recommend PT and Advil/Aleve as needed. They can continue activities as tolerated. 
No

## 2020-07-06 NOTE — H&P ADULT - NSHPLABSRESULTS_GEN_ALL_CORE
Complete Blood Count + Automated Diff (07.03.20 @ 10:20)    Nucleated RBC #: 0 K/uL    WBC Count: 6.85 K/uL    RBC Count: 4.82 M/uL    Hemoglobin: 14.7 g/dL    Hematocrit: 42.7 %    Mean Cell Volume: 88.6 fL    Mean Cell Hemoglobin: 30.5 pg    Mean Cell Hemoglobin Conc: 34.4 %    Red Cell Distrib Width: 12.2 %    Platelet Count - Automated: 217 K/uL    MPV: 10.9 fl    Auto Neutrophil #: 3.80 K/uL    Auto Lymphocyte #: 2.09 K/uL    Auto Monocyte #: 0.60 K/uL    Auto Eosinophil #: 0.24 K/uL    Auto Basophil #: 0.08 K/uL    Auto Neutrophil %: 55.4 %    Auto Lymphocyte %: 30.5 %    Auto Monocyte %: 8.8 %    Auto Eosinophil %: 3.5 %    Auto Basophil %: 1.2 %    Auto Immature Granulocyte %: 0.6: (Includes meta, myelo and promyelocytes) %

## 2020-07-06 NOTE — H&P ADULT - HISTORY OF PRESENT ILLNESS
54 y/o M with past surgical history of L inguinal hernia repair and no significant past medical history, presents today for persistent R inguinal hernia. Pt states he was called and told to come to the hospital for a surgical repair today. Patient states he is a  and two weeks ago he noticed a bulge in his right groin. Three days ago the pain worsened and was sharp and radiated to right testicle and medial right thigh. States pain is worse when he coughs and strains. Patient states he is passing gas and urinating appropriately. Denies any abdominal pain, nausea, vomiting, fever, or chills. Patient added on for OR schedule for open right inguinal hernia repair. 56 y/o M with past surgical history of L inguinal hernia repair and no significant past medical history, presents today for persistent R inguinal hernia. Patient states he is a  and two weeks ago he noticed a bulge in his right groin. Three days ago the pain worsened and was sharp and radiated to right testicle and medial right thigh. States pain is worse when he coughs and strains. Patient states he is passing gas and urinating appropriately. Denies any abdominal pain, nausea, vomiting, fever, or chills. Patient added on for OR schedule for open right inguinal hernia repair.

## 2020-07-07 ENCOUNTER — RESULT REVIEW (OUTPATIENT)
Age: 55
End: 2020-07-07

## 2020-07-07 ENCOUNTER — TRANSCRIPTION ENCOUNTER (OUTPATIENT)
Age: 55
End: 2020-07-07

## 2020-07-07 VITALS
OXYGEN SATURATION: 99 % | RESPIRATION RATE: 14 BRPM | SYSTOLIC BLOOD PRESSURE: 106 MMHG | DIASTOLIC BLOOD PRESSURE: 88 MMHG | HEART RATE: 70 BPM

## 2020-07-07 LAB
APTT BLD: 36.3 SEC — SIGNIFICANT CHANGE UP (ref 27–36.3)
BLD GP AB SCN SERPL QL: NEGATIVE — SIGNIFICANT CHANGE UP
HCT VFR BLD CALC: 41.6 % — SIGNIFICANT CHANGE UP (ref 39–50)
HGB BLD-MCNC: 13.4 G/DL — SIGNIFICANT CHANGE UP (ref 13–17)
INR BLD: 1.08 — SIGNIFICANT CHANGE UP (ref 0.88–1.17)
MCHC RBC-ENTMCNC: 28.8 PG — SIGNIFICANT CHANGE UP (ref 27–34)
MCHC RBC-ENTMCNC: 32.2 % — SIGNIFICANT CHANGE UP (ref 32–36)
MCV RBC AUTO: 89.5 FL — SIGNIFICANT CHANGE UP (ref 80–100)
NRBC # FLD: 0 K/UL — SIGNIFICANT CHANGE UP (ref 0–0)
PLATELET # BLD AUTO: 200 K/UL — SIGNIFICANT CHANGE UP (ref 150–400)
PMV BLD: 10.5 FL — SIGNIFICANT CHANGE UP (ref 7–13)
PROTHROM AB SERPL-ACNC: 12.4 SEC — SIGNIFICANT CHANGE UP (ref 9.8–13.1)
RBC # BLD: 4.65 M/UL — SIGNIFICANT CHANGE UP (ref 4.2–5.8)
RBC # FLD: 12.1 % — SIGNIFICANT CHANGE UP (ref 10.3–14.5)
RH IG SCN BLD-IMP: POSITIVE — SIGNIFICANT CHANGE UP
WBC # BLD: 5.21 K/UL — SIGNIFICANT CHANGE UP (ref 3.8–10.5)
WBC # FLD AUTO: 5.21 K/UL — SIGNIFICANT CHANGE UP (ref 3.8–10.5)

## 2020-07-07 PROCEDURE — 88302 TISSUE EXAM BY PATHOLOGIST: CPT | Mod: 26

## 2020-07-07 RX ORDER — SODIUM CHLORIDE 9 MG/ML
1000 INJECTION, SOLUTION INTRAVENOUS
Refills: 0 | Status: DISCONTINUED | OUTPATIENT
Start: 2020-07-07 | End: 2020-07-07

## 2020-07-07 RX ORDER — PANTOPRAZOLE SODIUM 20 MG/1
40 TABLET, DELAYED RELEASE ORAL DAILY
Refills: 0 | Status: DISCONTINUED | OUTPATIENT
Start: 2020-07-07 | End: 2020-07-07

## 2020-07-07 RX ORDER — OXYCODONE HYDROCHLORIDE 5 MG/1
1 TABLET ORAL
Qty: 12 | Refills: 0
Start: 2020-07-07 | End: 2020-07-08

## 2020-07-07 RX ORDER — BENZOCAINE AND MENTHOL 5; 1 G/100ML; G/100ML
1 LIQUID ORAL ONCE
Refills: 0 | Status: COMPLETED | OUTPATIENT
Start: 2020-07-07 | End: 2020-07-07

## 2020-07-07 RX ADMIN — Medication 400 MILLIGRAM(S): at 05:39

## 2020-07-07 RX ADMIN — OXYCODONE HYDROCHLORIDE 5 MILLIGRAM(S): 5 TABLET ORAL at 16:55

## 2020-07-07 RX ADMIN — BENZOCAINE AND MENTHOL 1 LOZENGE: 5; 1 LIQUID ORAL at 15:51

## 2020-07-07 NOTE — DISCHARGE NOTE PROVIDER - NSDCFUADDINST_GEN_ALL_CORE_FT
WOUND CARE:  Please keep incisions clean and dry. Please do not Scrub or rub incisions. Do not use lotion or powder on incisions.   BATHING: Please do not submerge wound underwater. You may shower and/or sponge bathe.  ACTIVITY: No heavy lifting or straining. Otherwise, you may return to your usual level of physical activity. If you are taking narcotic pain medication (such as Percocet) DO NOT drive a car, operate machinery or make important decisions.  DIET: Return to your usual diet.  NOTIFY YOUR SURGEON IF: You have any bleeding that does not stop, any pus draining from your wound(s), any fever (over 100.4 F) or chills, persistent nausea/vomiting, persistent diarrhea, or if your pain is not controlled on your discharge pain medications.  FOLLOW-UP: Please follow up with your primary care physician in one week regarding your hospitalization. Please follow-up with your surgeon, within 2 weeks following discharge- please call to schedule an appointment.

## 2020-07-07 NOTE — DISCHARGE NOTE PROVIDER - CARE PROVIDER_API CALL
Bebo Weinstein  SURGERY  [id number] [address] [address]  Phone: [phone]  Fax: [phone]  Follow Up Time:

## 2020-07-07 NOTE — DISCHARGE NOTE NURSING/CASE MANAGEMENT/SOCIAL WORK - PATIENT PORTAL LINK FT
You can access the FollowMyHealth Patient Portal offered by [facility] by registering at the following website: [email or web address]. By joining [employer]’s FollowMyHealth portal, you will also be able to view your health information using other applications (apps) compatible with our system.

## 2020-07-07 NOTE — DISCHARGE NOTE PROVIDER - NSDCMRMEDTOKEN_GEN_ALL_CORE_FT
famotidine 20 mg oral tablet: 1 tab(s) orally once a day, As needed, reflux  oxyCODONE 5 mg oral tablet: 1 tab(s) orally every 4 hours MDD:6 tabs

## 2020-07-07 NOTE — CHART NOTE - NSCHARTNOTEFT_GEN_A_CORE
CAPRINI SCORE [CLOT]    AGE RELATED RISK FACTORS                                                       MOBILITY RELATED FACTORS  [x] Age 41-60 years                                            (1 Point)                  [ ] Bed rest                                                        (1 Point)  [ ] Age: 61-74 years                                           (2 Points)                 [ ] Plaster cast                                                   (2 Points)  [ ] Age= 75 years                                              (3 Points)                 [ ] Bed bound for more than 72 hours                 (2 Points)    DISEASE RELATED RISK FACTORS                                               GENDER SPECIFIC FACTORS  [ ] Edema in the lower extremities                       (1 Point)                  [ ] Pregnancy                                                     (1 Point)  [ ] Varicose veins                                               (1 Point)                  [ ] Post-partum < 6 weeks                                   (1 Point)             [x] BMI > 25 Kg/m2                                            (1 Point)                  [ ] Hormonal therapy  or oral contraception          (1 Point)                 [ ] Sepsis (in the previous month)                        (1 Point)                  [ ] History of pregnancy complications                 (1 point)  [ ] Pneumonia or serious lung disease                                               [ ] Unexplained or recurrent                     (1 Point)           (in the previous month)                               (1 Point)  [ ] Abnormal pulmonary function test                     (1 Point)                 SURGERY RELATED RISK FACTORS  [ ] Acute myocardial infarction                              (1 Point)                 [ ]  Section                                             (1 Point)  [ ] Congestive heart failure (in the previous month)  (1 Point)               [ ] Minor surgery                                                  (1 Point)   [ ] Inflammatory bowel disease                             (1 Point)                 [ ] Arthroscopic surgery                                        (2 Points)  [ ] Central venous access                                      (2 Points)                [x] General surgery lasting more than 45 minutes   (2 Points)       [ ] Stroke (in the previous month)                          (5 Points)               [ ] Elective arthroplasty                                         (5 Points)                                                                                                                                               HEMATOLOGY RELATED FACTORS                                                 TRAUMA RELATED RISK FACTORS  [ ] Prior episodes of VTE                                     (3 Points)                [ ] Fracture of the hip, pelvis, or leg                       (5 Points)  [ ] Positive family history for VTE                         (3 Points)                 [ ] Acute spinal cord injury (in the previous month)  (5 Points)  [ ] Prothrombin 84438 A                                     (3 Points)                 [ ] Paralysis  (less than 1 month)                             (5 Points)  [ ] Factor V Leiden                                             (3 Points)                  [ ] Multiple Trauma within 1 month                        (5 Points)  [ ] Lupus anticoagulants                                     (3 Points)                                                           [ ] Anticardiolipin antibodies                               (3 Points)                                                       [ ] High homocysteine in the blood                      (3 Points)                                             [ ] Other congenital or acquired thrombophilia      (3 Points)                                                [ ] Heparin induced thrombocytopenia                  (3 Points)                                          Total Score [ 4 ]    Caprini Score 0 - 2:  Low Risk, No VTE Prophylaxis required for most patients, encourage ambulation  Caprini Score 3 - 6:  At Risk, pharmacologic VTE prophylaxis is indicated for most patients (in the absence of a contraindication)  Caprini Score Greater than or = 7:  High Risk, pharmacologic VTE prophylaxis is indicated for most patients (in the absence of a contraindication)

## 2020-07-07 NOTE — DISCHARGE NOTE PROVIDER - HOSPITAL COURSE
The patient was admitted 7/6 with pain from a right inguinal hernia. He was taken to the OR 7/7 for an inguinal hernia repair with mesh. He tolerated the procedure well. At the time of discharge, he was eating, voiding, ambulating, and his pain was controlled.

## 2020-07-07 NOTE — DISCHARGE NOTE NURSING/CASE MANAGEMENT/SOCIAL WORK - NSDCPNINST_GEN_ALL_CORE
no tylenol or acetominophen until after 7:10pm tonight. Do not take pain medication on an empty stomach.  Increase fluids and fiber in diet to prevent constipation.

## 2020-07-07 NOTE — PROGRESS NOTE ADULT - SUBJECTIVE AND OBJECTIVE BOX
SURGERY DAILY PROGRESS NOTE:       SUBJECTIVE/ROS: Patient resting comfortably in bed. States that when he urinated this morning, he could feel the hernia and experienced some sharp, shooting pain, but otherwise feels relatively comfortable and can tolerate the pain. Denies nausea, vomiting, chest pain, shortness of breath         MEDICATIONS  (STANDING):  acetaminophen  IVPB .. 1000 milliGRAM(s) IV Intermittent every 6 hours  enoxaparin Injectable 40 milliGRAM(s) SubCutaneous daily  lactated ringers. 1000 milliLiter(s) (125 mL/Hr) IV Continuous <Continuous>  pantoprazole  Injectable 40 milliGRAM(s) IV Push daily    MEDICATIONS  (PRN):  oxyCODONE    IR 5 milliGRAM(s) Oral Once PRN Moderate Pain (4 - 6)      OBJECTIVE:    Vital Signs Last 24 Hrs  T(C): 36.8 (07 Jul 2020 11:15), Max: 37.1 (06 Jul 2020 23:14)  T(F): 98.2 (07 Jul 2020 11:15), Max: 98.8 (06 Jul 2020 23:14)  HR: 68 (07 Jul 2020 11:15) (60 - 89)  BP: 146/76 (07 Jul 2020 11:15) (133/78 - 146/76)  BP(mean): --  RR: 16 (07 Jul 2020 11:15) (14 - 18)  SpO2: 98% (07 Jul 2020 11:15) (98% - 100%)    I&O's Detail    06 Jul 2020 07:01  -  07 Jul 2020 07:00  --------------------------------------------------------  IN:    lactated ringers.: 1500 mL  Total IN: 1500 mL    OUT:    Voided: 500 mL  Total OUT: 500 mL    Total NET: 1000 mL      07 Jul 2020 07:01  -  07 Jul 2020 11:39  --------------------------------------------------------  IN:  Total IN: 0 mL    OUT:    Voided: 400 mL  Total OUT: 400 mL    Total NET: -400 mL          Daily Height in cm: 153.9 (07 Jul 2020 10:21)    Daily     LABS:                        13.4   5.21  )-----------( 200      ( 07 Jul 2020 06:40 )             41.6     07-06    141  |  105  |  13  ----------------------------<  95  4.0   |  25  |  0.96    Ca    9.2      06 Jul 2020 16:00    TPro  6.7  /  Alb  4.4  /  TBili  0.5  /  DBili  x   /  AST  20  /  ALT  18  /  AlkPhos  79  07-06    PT/INR - ( 07 Jul 2020 06:40 )   PT: 12.4 SEC;   INR: 1.08          PTT - ( 07 Jul 2020 06:40 )  PTT:36.3 SEC      PHYSICAL EXAM:  General: AAOx3, NAD, lying comfortably in bed  Respiratory: nonlabored breathing  Abdomen: non-distended, soft, can palpate small hernia in lower right quadrant, near groin with and without cough  Extremities: no edema    ASSESSMENT AND PLAN: 54 y/o M with past surgical history of L inguinal hernia repair, presents today for persistent R inguinal hernia.  [ ] Pre-op and consented  [ ]  OR today     Team B Surgery   [id number]

## 2020-07-10 LAB — SURGICAL PATHOLOGY STUDY: SIGNIFICANT CHANGE UP

## 2020-12-21 PROBLEM — Z01.818 ENCOUNTER FOR DIAGNOSTIC ENDOSCOPY: Status: RESOLVED | Noted: 2019-06-03 | Resolved: 2020-12-21

## 2020-12-23 PROBLEM — Z12.11 ENCOUNTER FOR COLORECTAL CANCER SCREENING: Status: RESOLVED | Noted: 2019-06-03 | Resolved: 2020-12-23

## 2021-03-16 NOTE — ED PROCEDURE NOTE - CPROC ED INFORMED CONSENT1
detected. NRAS Mutation: Mutation not detected, wild type.     CEA 3488. Bone scan on 11/10/2015 noted no metastatic disease. CT chest on 11/10/2015 revealed Multiple pulmonary nodules in the upper and lower lobes consistent with metastatic disease;   Hepatic metastasis also visualized. For his advanced rectosigmoid cancer, systemic chemotherapy was recommended; FOLFOX + Avastin. Mediport was placed. Cycle # 1 of FOLFOX + Avastin was on 11/23/2015. CEA was 4555 on 11/23/2015. Cycle # 2 of FOLFOX + Avastin was on 12/08/2015. CEA was 3160 on 12/08/2015. Cycle # 3 of FOLFOX + Avastin was on 12/22/2015. CEA was 2516 on 12/21/2015. Cycle # 4 of FOLFOX + Avastin was on 01/05/2016. CEA was 2098 on 01/05/2015. Cycle # 5 of FOLFOX + Avastin was on 01/19/2016. CEA was 1511 on 01/05/2015.     -Bone scan on 01/26/2016 noted no metastatic disease. CT chest on 01/26/2016 revealed Significant response to treatment with no visible residual nodules. CT scan abdomen/pelvis on 01/26/2016 noted Interval decreased size of multiple masses in the liver compatible with treatment response. Continue another 2 months of FOLFOX + Avastin and repeat scans. Cycle # 6 of FOLFOX + Avastin was on 02/02/2016.  on 02/02/2016. Cycle # 7 of FOLFOX + Avastin was on 02/16/2016.  on 02/16/2016. Cycle # 8 of FOLFOX + Avastin was on 03/01/2016.  on 03/01/2016. Cycle # 9 of FOLFOX + Avastin was on 03/15/2016.  on 03/15/2016. Cycle # 10 of FOLFOX + Avastin was on 03/29/2016. .4 on 03/29/2016. Cycle # 11 of FOLFOX + Avastin was on 04/12/2016. .4 on 04/12/2016.     Re-staging scans on 04/19/2016: CT Chest: clear lungs; no evidence of recurring pulmonary nodule; CT Abdomen/Pelvis: Further interval decrease in size of the multiple metastatic hepatic lesions, the largest lesion now measures 3.9 x 3.5 cm and previously measured 4.5 cm in maximum diameter.  No mesenteric lymphadenopathy is identified; Bone Scan: No evidence of osseous metastasis. Continue same regimen and re-stage in 2-3 months. Cycle # 12 FOLFOX + Avastin was on 04/26/2016. .5 on 04/26/2016. Cycle # 13 FOLFOX + Avastin was on 05/10/2016. .3 on 05/10/2016. Cycle # 14 FOLFOX+AVASTIN was on 05/24/2016. .5 on 05/24/2016. Cycle # 15 FOLFOX + Avastin was on 06/07/2016. CEA 90.5 on 06/07/2016. Admitted to [facility] 06/13/2016-06/16/2016 for abdominal pain: EGD noted 1.5 cm clean based duodenal bulb ulceration s/p epinephrine and bicap per Dr. Terra Shelley. No active bleeding. A. Stomach, biopsy: Mild chronic gastritis, immunostain negative for Helicobacter  B. Esophagus, biopsy: Gastric glandular mucosa with prominent intestinal metaplasia ([place]'s epithelium), negative for epithelial dysplasia, esophageal squamous mucosa not identified  Cycle # 16 FOLFOX (discontinued avastin (bevacizumab) given association of peptic ulcer disease and known association of GI perforation) was on 06/21/2016. Cycle # 17 FOLFOX was on 07/05/2016. CEA 52.6 on 07/19/2016. Cycle # 17 FOLFOX was on 07/05/2016. CEA 52.6 on 07/05/2016. CEA 47.6 on 07/19/2016.     Re-staging scans 07/19/2106: CT Chest negative for metastatic disease. CT Abdomen/Pelvis: Stable hepatic lesions; Question new mural thickening in the cecum. Bone Scan: New Let hip lesion suspicious for bone metastasis.     Increased CEA; new mural thickening in the cecum and new left hip lesion suspicious for bone metastasis are consistent with disease progression; He derived maximum benefit from FOLFOX/AVASTIN. D/C FOLFOX/AVASTIN. We recommended FOLFIRI second line therapy. Cycle # 1 FOLFIRI was on 08/02/2016. CEA 40.8 on 08/02/2016. Xgeva q4 weeks started on 08/02/2016. Cycle # 2 FOLFIRI was on 08/16/2016. CEA 31.7 on 08/16/2016. Cycle # 3 FOLFIRI (added Avastin) was on 08/30/2016 given that ulcers healed on EGD 08/15/2016 by Dr. Swteha Osorio; Protonix bid since avastin re-started.    Colonoscopy on related to an joint rib fracture. Continue FOLFIRI + Avastin and repeat scans in 3 months. Cycle # 13 FOLFIRI + Avastin was on 01/24/2017. Cycle # 14 FOLFIRI + Avastin was on 02/07/2017. Cycle # 15 FOLFIRI + Avastin was on 02/21/2017. Cycle # 16 FOLFIRI + Avastin was on 03/07/2017. Cycle # 17 FOLFIRI + Avastin was on 03/21/2017. Cycle # 18 FOLFIRI + Avastin was on 04/04/2017. CT chest 04/17/2017 negative for metastatic disease. CT abdomen/pelvis 04/17/2017 Stable hypodense metastatic lesions within the liver. Stable area of increased sclerosis along the right acetabulum  Bone scan 04/17/2017 Stable subtle uptake within the left proximal femoral diaphysis; No definite abnormal uptake in the region of a sclerotic lesion along the posterior column of the right acetabulum noted on CT. Stable slight uptake within the left anterior sixth rib corresponding to linear sclerosis on the recent CT, favored to be related to a fracture. Continue FOLFIRI + Avastin and repeat scans in 3 months. Cycle # 19 FOLFIRI + Avastin was on 04/18/2017. Cycle # 20 FOLFIRI + Avastin was on 05/02/2017. Cycle # 21 FOLFIRI + Avastin was on 05/16/2017. Cycle # 22 FOLFIRI + Avastin was on 05/30/2017. Cycle # 23 FOLFIRI + Avastin was on 06/13/2017. Cycle # 24 FOLFIRI + Avastin was on 06/27/2017. Cycle # 25 FOLFIRI + Avastin was on 07/11/2017. Cycle # 26 FOLFIRI + Avastin was on 07/25/2017. Cycle # 27 FOLFIRI + Avastin was on 08/08/2017. Cycle # 28 FOLFIRI + Avastin was on 08/22/2017. Cycle # 29 FOLFIRI + Avastin was on 09/05/2017. Cycle # 30 FOLFIRI + Avastin was on 09/19/2017. Bone scan 09/26/2017 stable. CT abdomen/pelvis on 09/26/2017 Stable hypodense mass in the liver. Stable sclerotic lesion in the acetabulum. CT chest 09/26/2017 negative for metastatic disease. Cycle # 31 FOLFIRI + Avastin was on 10/03/2017. CEA 7.4 on 10/03/2017. Cycle # 32 FOLFIRI + Avastin was on 10/17/2017.  CEA 6.0 on 10/17/2017. Cycle # 33 FOLFIRI + Avastin was on 10/31/2017. CEA 6.8 on 10/31/2017. Cycle # 34 FOLFIRI + Avastin was on 11/14/2017. CEA 7.2 on 11/14/2017. Cycle # 35 FOLFIRI + Avastin was on 11/28/2017. CEA 5.1 on 11/28/2017. Cycle # 36 FOLFIRI + Avastin was on 12/12/2017. CEA 6.1 on 12/12/2017. Bone scan 12/22/2017 noted no evidence of metastatic disease to the axial or appendicular skeleton. CT chest 12/22/2017 noted no evidence of metastatic disease. CT abdomen/pelvis 12/22/2017 noted stable hypodense lesions in the liver. Continue FOLFIRI + Avastin and repeat scans in 3 months. Cycle # 37 FOLFIRI + Avastin was on 12/27/2018. Cycle # 38 FOLFIRI + Avastin was on 01/09/2018. Cycle # 39 FOLFIRI + Avastin was on 01/23/2018. Cycle # 40 FOLFIRI + Avastin was on 02/06/2018. Cycle # 41 FOLFIRI + Avastin was on 02/20/2018. Cycle # 42 FOLFIRI + Avastin was on 03/06/2018. Cycle # 43 FOLFIRI + Avastin was on 03/20/2018. Bone scan on 03/26/2018 negative for metastatic disease. CT chest 03/26/2018 noted ? new 7 mm nodule in LUCY. CT abdomen/pelvis 03/26/2018 noted stable hypodense lesions in the liver. ? New small ascites in the abdomen. Patient wants to continue to monitor the lung finding and repeat scans in 2 months instead of changing the current regimen into oral Lonsurf. Cycle # 44 FOLFIRI + Avastin was on 04/03/2018. CEA 6.3 on 04/03/2018. Cycle # 45 FOLFIRI + Avastin was on 04/24/2018. CEA 5.3 on 04/24/2018. Cycle # 46 FOLFIRI + Avastin was on 05/08/2018. CEA 5.4 on 05/08/2018. Cycle # 47 FOLFIRI + Avastin was on 05/22/2018. CEA 6.1 on 05/22/2018. CT chest 05/31/2018 noted stable nodule in LUCY. No evidence of worsening malignancy. CT abdomen/pelvis on 05/31/2018 noted stable liver metastasis. No evidence of worsening malignancy. Continue FOLFIRI + Avastin and repeat scans in 3 months. Cycle # 48 FOLFIRI + Avastin was on 06/06/2018. Cycle # 49 FOLFIRI + Avastin was on 06/19/2018. Cycle # 50 FOLFIRI + Avastin was on 07/03/2018. Cycle # 51 FOLFIRI + Avastin was on 07/24/2018. Cycle # 52 FOLFIRI + Avastin was on 08/14/2018. Cycle # 53 FOLFIRI + Avastin was on 08/28/2018. CT chest 09/06/2018 noted interval decrease in size of LUCY measuring up to 4 mm, suggestive of treatment response. No mediastinal or hilar LN  CT abdomen/pelvis 09/06/2018 Slight interval increase in size of a previously noted metastatic lesion within the right hepatic lobe. This may be related to differences in phase of enhancement compared to the most recent study from 5/31/2018, the lesion remains decreased in size compared to the more previous studies. No abdominal, retroperitoneal, or pelvic lymphadenopathy. Continue FOLFIRI + Avastin and repeat scans in 2 months. Cycle # 54 FOLFIRI + Avastin was on 09/11/2018. Cycle # 55 FOLFIRI + Avastin was on 09/25/2018. Cycle # 56 FOLFIRI + Avastin was on 10/09/2018. Cycle # 57 FOLFIRI + Avastin was on 10/23/2018. CT chest 11/02/2018 stable 3 mm nodule within LUCY. No new right and left lung nodule. No mediastinal or osseous lesion. CT abdomen/pelvis 11/02/2018 stable metastatic disease to liver. No new metastatic disease identified. No mesenteric or retroperitoneal LN. No gross colonic lesion identified. Continue FOLFIRI + Avastin and repeat scans in 3 months. Cycle # 58 FOLFIRI + Avastin was on 11/06/2018. CEA 7.6 on 11/05/2018. Cycle # 59 FOLFIRI + Avastin was on 11/27/2018. CEA 6.9 on 11/26/2018. Cycle # 60 FOLFIRI + Avastin was on 12/11/2018. CEA 6.7 on 12/11/2018. Cycle # 61 FOLFIRI + Avastin was on 01/08/2019. CEA 5.6 on 01/07/2019. Cycle # 62 FOLFIRI + Avastin was on 01/29/2019. CEA 4.6 on 01/28/2019. Cycle # 63 FOLFIRI + Avastin was on 02/12/2019. CEA 4.3 on 02/11/2019. CT chest 02/21/2019 no evidence of metastatic disease. CT abdomen/pelvis 02/21/2019 stable hypodense liver lesions. No evidence of worsening metastatic disease.  Large right Benefits, risks, and possible complications of procedure explained to patient/caregiver who verbalized understanding and gave verbal consent. decreased in size compared to the CT from 3/26/2018. No thoracic LN. CT abdomen/pelvis 12/19/2019 Previously described small hypodense lesions are more conspicuous on the current study compared to the recent study throughout the liver from 8/20/2019, however similar to the prior study from 2/21/2019. Findings may be related to differences in contrast opacification. No abdominal or pelvic lymphadenopathy. Continue FOLFIRI + Avastin and repeat scans in 2 months to f/u on liver lesions. Cycle # 81 FOLFIRI + Avastin was on 01/07/2020. CEA 3.8 on 01/06/2020. Cycle # 82 FOLFIRI + Avastin was on 01/21/2020. CEA 3.7 on 01/20/2020. Cycle # 83 FOLFIRI + Avastin was on 02/04/2020. CEA 4.1 on 02/03/2020. Cycle # 84 FOLFIRI + Avastin was on 02/18/2020. CEA 4.6 on 02/17/2020. EGD by Dr. Yusef Romero 03/02/2020 showing no masses or lesions  Cycle # 85 FOLFIRI + Avastin was on 03/03/2020. CEA 7.4 on 03/02/2020. Cycle # 86 FOLFIRI + Avastin was on 03/17/2020. CEA 4.5 on 03/16/2020. Colonoscopy on 03/23/2020 by Dr. Yusef Romero unremarkable (records requested). Cycle # 87 FOLFIRI + Avastin was on 03/31/2020. CEA 4.1 on 03/30/2020. Cycle # 88 FOLFIRI + Avastin was on 04/21/2020. CEA 6.4 on 04/20/2020. Cycle # 89 FOLFIRI + Avastin was on 05/05/2020. CEA 5.8 on 05/04/2020. Cycle # 90 FOLFIRI + Avastin was on 06/02/2020. CEA 5.5 on 06/01/2020. Cycle # 91 FOLFIRI + Avastin was on 06/16/2020. CEA 5.6 on 06/15/2020. CT chest 06/23/2020 noted no metastatic disease in the chest.   CT abdomen/pelvis 06/23/2020 Stable small liver lesions measuring up to 5 mm since 2019.   22 mm round mass in segment 8 of the liver with central high density stable from December 2019), previously measuring up to 34 mm in 2017. No additional metastatic disease in the abdomen or pelvis. Small amount of ascites. Overall stable disease. Continue FOLFIRI + Avastin and repeat scans in 2-3 months. Cycle # 92 FOLFIRI + Avastin was on 07/07/2020.  CEA 5.7 on 07/06/2020. Cycle # 93 FOLFIRI + Avastin was on 07/21/2020. CEA 5.9 on 07/20/2020. Cycle # 94 FOLFIRI + Avastin was on 08/04/2020. CEA 5.5 on 08/04/2020. Cycle # 95 FOLFIRI + Avastin was on 08/25/2020. CEA 6.1 on 08/24/2020. CT chest 9/2/2020 stable unremarkable enhanced CT of the thorax. There is no metastatic disease to the lungs. CT abdomen pelvis on 9/2/2020 stable 2.2 cm low attenuated lesion within the central aspect of the right hepatic lobe favored to represent treated metastatic disease. No new hepatic lesion is identified. Stable splenomegaly  There is no appreciable colonic lesion or stricture  Pericholecystic fluid of uncertain etiology. General surgery team consulted. Laparoscopic cholecystectomy with normal cholangiogram 10/27/20  Gallbladder: Chronic cholecystitis and cholelithiasis.  Unremarkable regional lymph node. 11/13/2020; Seen by Dr. Lorenzo Head from General surgery team; cleared to re-start chemotherapy. Cycle # 96 FOLFIRI + Avastin was on 11/17/2020. CEA 3.6 on 11/16/2020. Cycle # 97 FOLFIRI + Avastin was on 12/01/2020. CEA 3.5 on 11/30/2020. Cycle # 98 FOLFIRI + Avastin was on 12/15/2020. CEA 4.3 on 12/15/2020. Cycle # 99 FOLFIRI + Avastin was on 01/05/2021. CEA 4.7 on 01/04/2021. CT chest 01/13/2021 negative for metastatic disease. CT abdomen/pelvis 01/13/2021 Unchanged central hepatic lesion. No specific signs of abdominopelvic metastatic disease. Continue FOLFIRI + Avastin and repeat scans in 3 months. Cycle # 100 FOLFIRI + Avastin was on 01/19/2021. CEA 4.7 on 01/18/2021. Cycle # 101 FOLFIRI + Avastin was on 02/02/2021. CEA 5.2 on 02/01/2021. Cycle # 102 FOLFIRI + Avastin was on 02/16/2021. CEA 5.6 on 02/15/2021. Cycle # 103 FOLFIRI + Avastin was on 03/02/2021. Today 03/16/2021. No fever, chills. Worsening fatigue. Intermittent nausea and diarrhea. Mouth sores mucositis; worsening skin toxicity     Review of Systems;  CONSTITUTIONAL: No fever, chills. Fair appetite. Worsening fatigue. ENMT: Eyes: No diplopia; Nose: No epistaxis. Mouth:No lesions  RESPIRATORY: No hemoptysis, shortness of breath. CARDIOVASCULAR: No chest pain, palpitations. GASTROINTESTINAL:Intermittent nausea and diarrhea   GENITOURINARY: No dysuria, urinary frequency, hematuria. NEURO: No syncope, presyncope, headache. INTEGUMENTARY: Worsening skin toxicity  Remainder ROS: Negative. Past Medical History:   Past Medical History               Diagnosis Date    Arthritis      Cancer ([address]         colon    Depression      Hyperlipidemia      Hypertension           Medications:  Reviewed and reconciled.     Allergies: Allergies   Allergen Reactions    Neosporin [Neomycin-Polymyxin-Gramicidin] Rash    Tape Viona Chasten Tape] Rash      Physical Exam:  /63   Pulse 74   Temp 98 °F (36.7 °C)   Resp 18   Ht 6' 1\" (1.854 m)   Wt 175 lb 9.6 oz (79.7 kg)   SpO2 100%   BMI 23.17 kg/m²   GENERAL: Alert, oriented x 3, tired  HEENT: PERRLA, EOMI. NECK: Supple. Without lymphadenopathy. LUNGS: Lungs are CTA bilaterally, with no wheezing, crackles or rhonchi. CARDIOVASCULAR: Regular rhythm. No murmurs, rubs or gallops. ABDOMEN: Soft. Non-tender, non-distended. Positive bowel sounds. EXTREMITIES: Without clubbing, cyanosis or edema. Skin toxicity in palms  NEUROLOGIC: No focal deficits.    ECOG PS 1-2    Diagnostics:  Lab Results   Component Value Date    WBC 2.2 (L) 03/15/2021    HGB 7.7 (L) 03/15/2021    HCT 25.1 (L) 03/15/2021    MCV 96.9 03/15/2021     (L) 03/15/2021     Lab Results   Component Value Date     03/15/2021    K 3.4 (L) 03/15/2021     03/15/2021    CO2 24 03/15/2021    BUN 21 03/15/2021    CREATININE 1.3 (H) 03/15/2021    GLUCOSE 119 (H) 03/15/2021    CALCIUM 9.2 03/15/2021    PROT 6.2 (L) 03/15/2021    LABALBU 3.5 03/15/2021    BILITOT 0.7 03/15/2021    ALKPHOS 183 (H) 03/15/2021    AST 23 03/15/2021    ALT 14 03/15/2021    LABGLOM 54 03/15/2021    GFRAA >60 03/15/2021     Impression/Plan:  67 y/o  male with 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 KRAS Mutation: Mutation detected. BRAF Mutation: Mutation not detected. NRAS Mutation: Mutation not detected, wild type. CT scan abdomen/pelvis on 10/26/2015 revealed small nodules at the lung bases, extensive liver lesions consistent with metastatic rectosigmoid cancer. CEA 3488 on 10/30/2015. Bone scan on 11/10/2015 noted no metastatic disease. CT chest on 11/10/2015 revealed Multiple pulmonary nodules in the upper and lower lobes consistent with metastatic disease; Hepatic metastasis also visualized. For his advanced rectosigmoid cancer, systemic chemotherapy was recommended; FOLFOX + Avastin. Mediport was placed. Cycle # 1 of FOLFOX + Avastin was on 11/23/2015. CEA was 4555 on 11/23/2015. Cycle # 2 of FOLFOX + Avastin was on 12/08/2015. CEA was 3160 on 12/08/2015. Cycle # 3 of FOLFOX + Avastin was on 12/22/2015. CEA was 2516 on 12/21/2015. Cycle # 4 of FOLFOX + Avastin was on 01/05/2016. CEA was 2098 on 01/05/2015. Cycle # 5 of FOLFOX + Avastin was on 01/19/2016. CEA was 1511 on 01/05/2015.     -Bone scan on 01/26/2016 noted no metastatic disease. CT chest on 01/26/2016 revealed Significant response to treatment with no visible residual nodules. CT scan abdomen/pelvis on 01/26/2016 noted Interval decreased size of multiple masses in the liver compatible with treatment response. Continue another 2 months of FOLFOX + Avastin and repeat scans. Cycle # 6 of FOLFOX + Avastin was on 02/02/2016.  on 02/02/2016. Cycle # 7 of FOLFOX + Avastin was on 02/16/2016.  on 02/16/2016. Cycle # 8 of FOLFOX + Avastin was on 03/01/2016.  on 03/01/2016. Cycle # 9 of FOLFOX + Avastin was on 03/15/2016.  on 03/15/2016. Cycle # 10 of FOLFOX + Avastin was on 03/29/2016. .4 on 03/29/2016. Cycle # 11 of FOLFOX + Avastin was on 04/12/2016.  .4 on 04/12/2016.     Re-staging scans on 04/19/2016: CT Chest: clear lungs; no evidence of recurring pulmonary nodule; CT Abdomen/Pelvis: Further interval decrease in size of the multiple metastatic hepatic lesions, the largest lesion now measures 3.9 x 3.5 cm and previously measured 4.5 cm in maximum diameter. No mesenteric lymphadenopathy is identified; Bone Scan: No evidence of osseous metastasis. Continue same regimen and re-stage in 2-3 months. Cycle # 12 FOLFOX + Avastin was on 04/26/2016. .5 on 04/26/2016. Cycle # 13 FOLFOX + Avastin was on 05/10/2016. .3 on 05/10/2016. Cycle # 14 FOLFOX+AVASTIN was on 05/24/2016. .5 on 05/24/2016. Cycle # 15 FOLFOX + Avastin was on 06/07/2016. CEA 90.5 on 06/07/2016. Admitted to [facility] 06/13/2016-06/16/2016 for abdominal pain: EGD noted 1.5 cm clean based duodenal bulb ulceration s/p epinephrine and bicap per Dr. Carl Bacon. No active bleeding. A. Stomach, biopsy: Mild chronic gastritis, immunostain negative for Helicobacter  B. Esophagus, biopsy: Gastric glandular mucosa with prominent ntestinal metaplasia ([place]'s epithelium), negative for epithelial dysplasia, esophageal squamous mucosa not identified     Cycle # 16 FOLFOX (discontinued avastin (bevacizumab) given association of peptic ulcer disease and known association of GI perforation.) was on 06/21/2016. CEA 47 on 06/21/2016. Cycle # 17 FOLFOX was on 07/05/2016. CEA 52.6 on 07/05/2016. CEA 47.6 on 07/19/2016.     Re-staging scans 07/19/2106: CT Chest negative for metastatic disease. CT Abdomen/Pelvis: Stable hepatic lesions; Question new mural thickening in the cecum. Bone Scan: New Let hip lesion suspicious for bone metastasis.     Increased CEA; new mural thickening in the cecum and new left hip lesion suspicious for bone metastasis are consistent with disease progression; He derived maximum benefit from FOLFOX/AVASTIN. D/C FOLFOX/AVASTIN. We recommended FOLFIRI second line therapy.    Cycle along the right acetabulum suspicious for metastatic disease. Bone scan on 01/23/2017 Stable subtle uptake within the left proximal diaphysis, again suggestive of metastatic disease  Decreased subtle uptake within the medial right acetabulum. Decreased uptake within the left anterior sixth rib corresponding to linear sclerosis on the recent CT, favored to be related to an joint rib fracture. Continue FOLFIRI + Avastin and repeat scans in 3 months. Cycle # 13 FOLFIRI + Avastin was on 01/24/2017. Cycle # 14 FOLFIRI + Avastin was on 02/07/2017. Cycle # 15 FOLFIRI + Avastin was on 02/21/2017. Cycle # 16 FOLFIRI + Avastin was on 03/07/2017. Cycle # 17 FOLFIRI + Avastin was on 03/21/2017. Cycle # 18 FOLFIRI + Avastin was on 04/04/2017. CT chest 04/17/2017 negative for metastatic disease. CT abdomen/pelvis 04/17/2017 Stable hypodense metastatic lesions within the liver. Stable area of increased sclerosis along the right acetabulum  Bone scan 04/17/2017 Stable subtle uptake within the left proximal femoral diaphysis; No definite abnormal uptake in the region of a sclerotic lesion along the posterior column of the right acetabulum noted on CT. Stable slight uptake within the left anterior sixth rib corresponding to linear sclerosis on the recent CT, favored to be related to a fracture. Continue FOLFIRI + Avastin and repeat scans in 3 months. Cycle # 19 FOLFIRI + Avastin was on 04/18/2017. CEA 7.5 on 04/18/2017. Cycle # 20 FOLFIRI + Avastin was on 05/02/2017. CEA 7.7 on 05/02/2017. Cycle # 21 FOLFIRI + Avastin was on 05/16/2017. CEA 7.1 on 05/16/2017. Cycle # 22 FOLFIRI + Avastin was on 05/30/2017. CEA 8.2 on 05/30/2017. Cycle # 23 FOLFIRI + Avastin was on 06/13/2017. CEA 7.7 on 06/13/2017. Cycle # 24 FOLFIRI + Avastin was on 06/27/2017. CEA 6.6 on 06/27/2017.    Re-staging scans 07/05/2017:  Bone scan stable proximal left femoral diaphysis, right acetabulum, and left anterior sixth rib lesions;  CT abdomen/pelvis stable hypodense metastatic lesions within the liver; CT chest without convincing evidence of metastatic disease. Cycle # 25 FOLFIRI + Avastin was on 07/11/2017. CEA 6.3 on 07/11/2017. Cycle # 26 FOLFIRI + Avastin was on 07/25/2017. CEA 7.3 on 07/25/2017. Cycle # 27 FOLFIRI + Avastin was on 08/08/2017. CEA 6.5 on 08/08/2017. Cycle # 28 FOLFIRI + Avastin was on 08/22/2017. CEA 5.9 on 08/22/2017. Cycle # 29 FOLFIRI + Avastin was on 09/05/2017. CEA 6.3 on 09/05/2017. Cycle # 30 FOLFIRI + Avastin was on 09/19/2017. CEA 6.3 on 09/19/2017. Bone scan 09/26/2017 stable. CT abdomen/pelvis on 09/26/2017 Stable hypodense mass in the liver. Stable sclerotic lesion in the acetabulum. CT chest 09/26/2017 negative for metastatic disease. Cycle # 31 FOLFIRI + Avastin was on 10/03/2017. CEA 7.4 on 10/03/2017. Cycle # 32 FOLFIRI + Avastin was on 10/17/2017. CEA 6.0 on 10/17/2017. Cycle # 33 FOLFIRI + Avastin was on 10/31/2017. CEA 6.8 on 10/31/2017. Cycle # 34 FOLFIRI + Avastin was on 11/14/2017. CEA 7.2 on 11/14/2017. Cycle # 35 FOLFIRI + Avastin was on 11/28/2017. CEA 5.1 on 11/28/2017. Cycle # 36 FOLFIRI + Avastin was on 12/12/2017. CEA 6.1 on 12/12/2017. Bone scan 12/22/2017 noted no evidence of metastatic disease to the axial or appendicular skeleton. CT chest 12/22/2017 noted no evidence of metastatic disease. CT abdomen/pelvis 12/22/2017 noted stable hypodense lesions in the liver. Continue FOLFIRI + Avastin and repeat scans in 3 months. Cycle # 37 FOLFIRI + Avastin was on 12/27/2018. CEA 6.7 on 12/27/2017. Cycle # 38 FOLFIRI + Avastin was on 01/09/2018. CEA 5.0 on 01/09/2018. Cycle # 39 FOLFIRI + Avastin was on 01/23/2018. CEA 6.5 on 01/23/2018. Cycle # 40 FOLFIRI + Avastin was on 02/06/2018. CEA 6.9 on 02/06/2018. Cycle # 41 FOLFIRI + Avastin was on 02/20/2018. CEA 6.4 on 02/20/2018. Cycle # 42 FOLFIRI + Avastin was on 03/06/2018. CEA 6.6 on 03/06/2018.   Cycle # 43 FOLFIRI + Avastin and repeat scans in 2 months. Cycle # 54 FOLFIRI + Avastin was on 09/11/2018. CEA 6.4 on 09/10/2018. Cycle # 55 FOLFIRI + Avastin was on 09/25/2018. CEA 6.4 on 09/25/2018. Cycle # 56 FOLFIRI + Avastin was on 10/09/2018. CEA 6.7 on 10/08/2018. Cycle # 57 FOLFIRI + Avastin was on 10/23/2018. CEA 7.2 on 10/22/2018. CT chest 11/02/2018 stable 3 mm nodule within LUCY. No new right and left lung nodule. No mediastinal or osseous lesion. CT abdomen/pelvis 11/02/2018 stable metastatic disease to liver. No new metastatic disease identified. No mesenteric or retroperitoneal LN. No gross colonic lesion identified. Continue FOLFIRI + Avastin and repeat scans in 3 months. Cycle # 58 FOLFIRI + Avastin was on 11/06/2018. CEA 7.6 on 11/05/2018. Cycle # 59 FOLFIRI + Avastin was on 11/27/2018. CEA 6.9 on 11/26/2018. Cycle # 60 FOLFIRI + Avastin was on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

## 2021-08-30 NOTE — DISCHARGE NOTE ADULT - PROVIDER RX CONTACT NUMBER
What Type Of Note Output Would You Prefer (Optional)?: Standard Output
How Severe Are Your Spot(S)?: mild
Have Your Spot(S) Been Treated In The Past?: has not been treated
Hpi Title: Evaluation of Skin Lesions
[phone]

## 2021-09-01 ENCOUNTER — EMERGENCY (EMERGENCY)
Facility: HOSPITAL | Age: 56
LOS: 0 days | Discharge: HOME | End: 2021-09-02
Attending: STUDENT IN AN ORGANIZED HEALTH CARE EDUCATION/TRAINING PROGRAM
Payer: COMMERCIAL
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
## 2021-09-01 DIAGNOSIS — L30.9 DERMATITIS, UNSPECIFIED: ICD-10-CM
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
## 2021-09-01 DIAGNOSIS — M25.511 PAIN IN RIGHT SHOULDER: ICD-10-CM

## 2021-09-01 DIAGNOSIS — S30.0XXA CONTUSION OF LOWER BACK AND PELVIS, INITIAL ENCOUNTER: ICD-10-CM

## 2021-09-01 DIAGNOSIS — Z98.890 OTHER SPECIFIED POSTPROCEDURAL STATES: Chronic | ICD-10-CM

## 2021-09-01 DIAGNOSIS — M54.9 DORSALGIA, UNSPECIFIED: ICD-10-CM

## 2021-09-01 DIAGNOSIS — M54.2 CERVICALGIA: ICD-10-CM

## 2021-09-01 DIAGNOSIS — M25.512 PAIN IN LEFT SHOULDER: ICD-10-CM

## 2021-09-01 PROCEDURE — 99285 EMERGENCY DEPT VISIT HI MDM: CPT

## 2021-09-01 PROCEDURE — 99053 MED SERV 10PM-8AM 24 HR FAC: CPT

## 2021-09-02 VITALS
DIASTOLIC BLOOD PRESSURE: 92 MMHG | OXYGEN SATURATION: 99 % | SYSTOLIC BLOOD PRESSURE: 170 MMHG | HEART RATE: 74 BPM | HEIGHT: 60.6 IN | TEMPERATURE: 98 F | RESPIRATION RATE: 16 BRPM | WEIGHT: 151.9 LBS

## 2021-09-02 VITALS
OXYGEN SATURATION: 98 % | DIASTOLIC BLOOD PRESSURE: 78 MMHG | HEART RATE: 66 BPM | TEMPERATURE: 98 F | RESPIRATION RATE: 16 BRPM | SYSTOLIC BLOOD PRESSURE: 150 MMHG

## 2021-09-02 PROCEDURE — 71045 X-RAY EXAM CHEST 1 VIEW: CPT | Mod: 26

## 2021-09-02 PROCEDURE — 93010 ELECTROCARDIOGRAM REPORT: CPT

## 2021-09-02 PROCEDURE — 71250 CT THORAX DX C-: CPT | Mod: 26,MA

## 2021-09-02 PROCEDURE — 70450 CT HEAD/BRAIN W/O DYE: CPT | Mod: 26

## 2021-09-02 PROCEDURE — 72131 CT LUMBAR SPINE W/O DYE: CPT | Mod: 26,MA

## 2021-09-02 PROCEDURE — 72125 CT NECK SPINE W/O DYE: CPT | Mod: 26

## 2021-09-02 PROCEDURE — 72128 CT CHEST SPINE W/O DYE: CPT | Mod: 26,MA

## 2021-09-02 RX ORDER — ACETAMINOPHEN 500 MG
650 TABLET ORAL ONCE
Refills: 0 | Status: COMPLETED | OUTPATIENT
Start: 2021-09-02 | End: 2021-09-02

## 2021-09-02 RX ORDER — IBUPROFEN 200 MG
600 TABLET ORAL ONCE
Refills: 0 | Status: COMPLETED | OUTPATIENT
Start: 2021-09-02 | End: 2021-09-02

## 2021-09-02 RX ORDER — CYCLOBENZAPRINE HYDROCHLORIDE 10 MG/1
1 TABLET, FILM COATED ORAL
Qty: 9 | Refills: 0
Start: 2021-09-02 | End: 2021-09-04

## 2021-09-02 RX ADMIN — Medication 600 MILLIGRAM(S): at 03:29

## 2021-09-02 RX ADMIN — Medication 650 MILLIGRAM(S): at 01:01

## 2021-09-02 NOTE — ED ADULT NURSE NOTE - NSIMPLEMENTINTERV_GEN_ALL_ED
Implemented All Universal Safety Interventions:  [place] to call system. Call bell, personal items and telephone within reach. Instruct patient to call for assistance. Room bathroom lighting operational. Non-slip footwear when patient is off stretcher. Physically safe environment: no spills, clutter or unnecessary equipment. Stretcher in lowest position, wheels locked, appropriate side rails in place.

## 2021-09-02 NOTE — ED PROVIDER NOTE - PHYSICAL EXAMINATION
VITAL SIGNS: I have reviewed nursing notes and confirm.   GEN: Well-developed; well-nourished; in no acute distress. Speaking full sentences. gcs 15  SKIN: Warm, pink, no rash, no diaphoresis, no cyanosis, well perfused.   HEAD: Normocephalic; atraumatic. No scalp lacerations, no abrasions.  NECK: Supple; non tender. (+) midline ttp, no stepoffs  EYES: Pupils 3mm equal, round, reactive to light and accomodation, conjunctiva and sclera clear. Extra-ocular movements intact bilaterally.  ENT: No nasal discharge; airway clear. Trachea is midline. ORAL: No oropharyngeal exudates or erythema. Normal dentition.  CV: Regular rate and rhythm. S1, S2 normal; no murmurs, gallops, or rubs. No lower extremity pitting edema bilaterally. Capillary refill < 2 seconds throughout. Distal pulses intact 2+ throughout.  RESP: CTA bilaterally. No wheezes, rales, or rhonchi.   ABD: Normal bowel sounds, soft, non-distended, non-tender, no hepatosplenomegaly. No CVA tenderness bilaterally.  MSK: Normal range of motion and movement of all 4 extremities. No joint or muscular pain throughout. No clubbing.   BACK: (+) thoracolumbar midline, (+) paravertebral tenderness. No step-offs or obvious deformities.  NEURO: Alert & oriented x 3, Grossly unremarkable. Sensory and motor intact throughout. No focal deficits. Gait: Fluid. Normal speech and coordination.   PSYCH: Cooperative, appropriate.
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
## 2021-09-02 NOTE — ED ADULT NURSE NOTE - NS ED NURSE RECORD ANOTHER HT AND WT
Jaylyn Man is here today for Physical    Concerns/symptoms: physical  Medications: medications verified, no change  Refills needed today? No     Tobacco history: verified  Advanced Directives: No not on file, not interested.  BP greater than 140/90? No    Patient would like communication of their results via:    Cell Phone:   Telephone Information:   Mobile [phone]     Okay to leave a message containing results? Yes  Preferred language:  Citizen of [place].    Health Maintenance Due   Topic Date Due   • Influenza Vaccine (1) 09/01/2017      Patient is due for topics as listed above, she wishes to discuss with provider.    Flu Questionnaire      1.  Does you have a moderate to severe fever?  []  Yes  [x]  No    2.  Have you had a serious reaction to a flu shot before?   []  Yes  [x]  No    3.  Have you ever had [place] Syndrome within 6 weeks of a previous flu shot?     []  Yes   [x]  No    4.  Do you have a serious allergy to eggs?  []  Yes   [x]  No    5.  If person is answering for a child, is the child less that 6 months of age? []  Yes  [x] No      A \"YES\" answer to any question above means the patient is not eligible to receive the vaccine.  Vaccine Information Statement(s) given and reviewed, questions answered. Patient tolerated without incident.    Yes

## 2021-09-02 NOTE — ED PROVIDER NOTE - MDM PATIENT STATEMENT FOR ADDL TREATMENT
Notification Instructions: Patient will be notified of biopsy results. However, patient instructed to call the office if not contacted within 2 weeks. Patient with one or more new problems requiring additional work-up/treatment.

## 2021-09-02 NOTE — ED PROVIDER NOTE - PATIENT PORTAL LINK FT
You can access the FollowMyHealth Patient Portal offered by [facility] by registering at the following website: [email or web address]. By joining [employer]’s FollowMyHealth portal, you will also be able to view your health information using other applications (apps) compatible with our system.

## 2021-09-02 NOTE — ED PROVIDER NOTE - PROGRESS NOTE DETAILS
CT negative. dc home. I have discussed with the patient about the ED workup, lab results, diagnostics results, plan for discharge home, need for follow-up with primary care physician/specialists, and return precautions. At this time, the patient does not require further workup in the ED. The patient is subjectively feeling better and would like to be discharged home. The patient had the opportunity to ask questions and I have answered all inquiries. The patient verbalizes understanding and agreement with the plan. The patient is hemodynamically stable, clinically well-appearing, ambulatory, mentating well and ready for discharge home.
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
## 2021-12-06 ENCOUNTER — APPOINTMENT (OUTPATIENT)
Dept: PULMONOLOGY | Facility: CLINIC | Age: 56
End: 2021-12-06
Payer: MEDICAID

## 2021-12-06 VITALS
OXYGEN SATURATION: 99 % | TEMPERATURE: 97.9 F | HEIGHT: 67 IN | WEIGHT: 154 LBS | HEART RATE: 72 BPM | BODY MASS INDEX: 24.17 KG/M2

## 2021-12-06 VITALS — HEART RATE: 74 BPM | SYSTOLIC BLOOD PRESSURE: 146 MMHG | RESPIRATION RATE: 16 BRPM | DIASTOLIC BLOOD PRESSURE: 93 MMHG

## 2021-12-06 DIAGNOSIS — Z00.00 ENCOUNTER FOR GENERAL ADULT MEDICAL EXAMINATION W/OUT ABNORMAL FINDINGS: ICD-10-CM

## 2021-12-06 DIAGNOSIS — R06.2 WHEEZING: ICD-10-CM

## 2021-12-06 PROCEDURE — ZZZZZ: CPT

## 2021-12-06 PROCEDURE — 94729 DIFFUSING CAPACITY: CPT

## 2021-12-06 PROCEDURE — 99203 OFFICE O/P NEW LOW 30 MIN: CPT | Mod: 25

## 2021-12-06 PROCEDURE — 94726 PLETHYSMOGRAPHY LUNG VOLUMES: CPT

## 2021-12-06 PROCEDURE — 94010 BREATHING CAPACITY TEST: CPT

## 2021-12-06 RX ORDER — ALBUTEROL SULFATE 90 UG/1
108 (90 BASE) INHALANT RESPIRATORY (INHALATION)
Qty: 1 | Refills: 3 | Status: ACTIVE | COMMUNITY
Start: 2021-12-06 | End: 1900-01-01

## 2021-12-06 NOTE — END OF VISIT
[FreeTextEntry3] : I obtained the history and examined the patient and developed a plan of care  Bin Stoner MD\par
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
## 2021-12-06 NOTE — PHYSICAL EXAM
[No Acute Distress] : no acute distress [Normal Rate/Rhythm] : normal rate/rhythm [Normal S1, S2] : normal s1, s2 [No Murmurs] : no murmurs [No Resp Distress] : no resp distress [No Focal Deficits] : no focal deficits [Oriented x3] : oriented x3 [Normal Affect] : normal affect [TextBox_68] : faint inspiratory wheeze heard to right upper lobe

## 2021-12-06 NOTE — REVIEW OF SYSTEMS
[Wheezing] : wheezing [Negative] : Cardiovascular [Cough] : no cough [Chest Tightness] : no chest tightness [Dyspnea] : no dyspnea [SOB on Exertion] : no sob on exertion
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
## 2022-03-30 NOTE — ED PROVIDER NOTE - PRINCIPAL DIAGNOSIS
Continue Regimen: Clobetasol 0.05% ointment twice daily for up to two weeks per month as needed Plan: RTC in 6 months for follow up evaluation Detail Level: Zone Inguinal hernia

## 2022-04-11 ENCOUNTER — EMERGENCY (EMERGENCY)
Facility: HOSPITAL | Age: 57
LOS: 1 days | Discharge: HOME | End: 2022-04-11
Attending: EMERGENCY MEDICINE | Admitting: EMERGENCY MEDICINE
Payer: MEDICAID

## 2022-04-11 VITALS
DIASTOLIC BLOOD PRESSURE: 88 MMHG | OXYGEN SATURATION: 100 % | RESPIRATION RATE: 16 BRPM | HEART RATE: 91 BPM | SYSTOLIC BLOOD PRESSURE: 156 MMHG | HEIGHT: 60.6 IN | TEMPERATURE: 97 F

## 2022-04-11 DIAGNOSIS — Z98.890 OTHER SPECIFIED POSTPROCEDURAL STATES: Chronic | ICD-10-CM

## 2022-04-11 PROCEDURE — 99284 EMERGENCY DEPT VISIT MOD MDM: CPT

## 2022-04-11 RX ORDER — OXYCODONE AND ACETAMINOPHEN 5; 325 MG/1; MG/1
1 TABLET ORAL ONCE
Refills: 0 | Status: DISCONTINUED | OUTPATIENT
Start: 2022-04-11 | End: 2022-04-11

## 2022-04-11 RX ADMIN — OXYCODONE AND ACETAMINOPHEN 1 TABLET(S): 5; 325 TABLET ORAL at 21:32

## 2022-04-11 NOTE — ED PROVIDER NOTE - OBJECTIVE STATEMENT
56 y/o male with c/o left sided dental pain.  Has had upper and lower tooth pain on left for past 4 days.  Went to his dentist and had several visits for extractions of a tooth at the upper and lower left sides.  Had a return visit today for a tooth fragment that remained at the left upper extraction site. 56 y/o male with c/o left sided dental pain.  Has had upper and lower tooth pain on left for past 4 days.  Went to his dentist and had several visits for extractions of a tooth at the upper and lower left sides.  Had a return visit today for a tooth fragment that remained at the left upper extraction site.  States he has had continued pain, swelling to left face, and feels like there is still a tooth fragment at the upper extraction site.  Taking abx via dentist x1 day.  Given tylenol with little relief.

## 2022-04-11 NOTE — ED PROVIDER NOTE - PHYSICAL EXAMINATION
dental extraction sites at left upper and lower molars with stable clot, no gingival swelling, no palp fb with cotton tip applicator probe, minimal facial swelling, no erythema to skin

## 2022-04-11 NOTE — ED PROVIDER NOTE - CLINICAL SUMMARY MEDICAL DECISION MAKING FREE TEXT BOX
56 y/o male s/p teeth extractions with c/o pain and swelling to face.  Spoke with dental resident:  recommend continue abx, no retrieval even if retained tooth fragment until 2-3 days s/p extraction given swelling.  Recommend pain control, dc with dental f/u as o/p.

## 2022-04-11 NOTE — ED PROVIDER NOTE - PATIENT PORTAL LINK FT
You can access the FollowMyHealth Patient Portal offered by [facility] by registering at the following website: [email or web address]. By joining [employer]’s FollowMyHealth portal, you will also be able to view your health information using other applications (apps) compatible with our system.

## 2022-04-11 NOTE — ED ADULT NURSE NOTE - OBJECTIVE STATEMENT
Pt received to intake 16. Pt is A&Ox4, ambulatory at baseline. Pt is c/o left tooth pain. Pt states that he has been going back and forth to his dentist for the last week regarding this tooth, dentist has been "extracting pieces" of the tooth. Pt states pain has not been improving. Minimal swelling noted to the left cheek. RR even and unlabored. Pt denies dizziness, n/v/d, CP, SOB. Endorses some pain in right ear and headache. Safety maintained

## 2022-05-16 NOTE — PHYSICAL THERAPY INITIAL EVALUATION ADULT - REHAB POTENTIAL, PT EVAL
good, to achieve stated therapy goals Metronidazole Pregnancy And Lactation Text: This medication is Pregnancy Category B and considered safe during pregnancy.  It is also excreted in breast milk.
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
## 2023-11-01 NOTE — ED PROVIDER NOTE - DR. NAME
What Type Of Note Output Would You Prefer (Optional)?: Bullet Format
How Severe Is Your Skin Lesion?: moderate
Is This A New Presentation, Or A Follow-Up?: Skin Lesions
Navneet Liriano

## 2023-12-31 NOTE — DISCHARGE NOTE NURSING/CASE MANAGEMENT/SOCIAL WORK - INFLUENZA IMMUNIZATION DATE (APPROXIMATE):
Patient with crackles to bilateral lung bases. He has a persistent non-productive wet cough.  Temp 99.9.  Incentive spirometer given to patient and was shown how to use it. He was out of bed in the chair.  Deep breathing encouraged.    Dr. Barajas is aware of symptoms who recommended conservative treatment.      Patient was up in chair this afternoon.  Incentive spirometer at bedside.  Patient was educated on how to use it and that it helps his lungs open up.    24-Sep-2018

## 2024-03-27 NOTE — PRE-OP CHECKLIST - ADVANCE DIRECTIVE ADDRESSED/READDRESSED
Patient here today for blood pressure check.    Patient has not been experiencing these symptoms.      Current BP Medications and Dosages are:  Metoprolol Tartrate 25 mg (1/2 tablet 12.5 mg) twice daily   Lisinopril Hydrochlorothiazide 20-12.5 mg daily   (Lisinopril hydrochlorothiazide, Discontinued at [facility],d/t Low BP, patient is still taking it he did not want to stop it)      Patient took BP Medications today: Yes  Brought in bottles     Manual BP  Today's Blood Pressure 132/70 and pulse 58  Recheck Blood Pressure 128/74 and pulse 58    Home BP Monitor  Today's Blood Pressure 148/91 and pulse 59  Recheck Blood Pressure 138/101 and pulse 65    Per patient BP at home trends in the 120's/80    Patient verbalized understanding of the directions provided.  Patient agrees to call the clinic if any questions arise: Yes      Sent to Dr. Avendaño to review    done

## 2024-08-21 NOTE — PATIENT PROFILE ADULT - PATIENT REPRESENTATIVE: ( YOU CAN CHOOSE ANY PERSON THAT CAN ASSIST YOU WITH YOUR HEALTH CARE PREFERENCES, DOES NOT HAVE TO BE A SPOUSE, IMMEDIATE FAMILY OR SIGNIFICANT OTHER/PARTNER)
file     PHYSICIAN CONSULTS ORDERED THIS ENCOUNTER:  None  FINAL IMPRESSION      1. Atypical chest pain          DISPOSITION/PLAN   DISPOSITION Decision To Discharge 08/21/2024 09:53:32 AM  Condition at Disposition: Stable      OUTPATIENT FOLLOW UP THE PATIENT:  Jose Conner MD  [address]  [facility] [id number] [phone]    Schedule an appointment as soon as possible for a visit   As needed      Erica B Goldberger, MD Goldberger, Erica B, MD  08/21/24 7406    
yes

## 2024-12-18 NOTE — ED PROVIDER NOTE - NS ED ATTENDING STATEMENT MOD
Onset: This morning        Location / description: Pt had gallbladder surgery 2 weeks ago , vomiting , extreme abdominal pain , cant keep anything down     Precipitating Factors: See above     Pain Scale (1-10), 10 highest: 10/10    What improves/worsens symptoms: Pt unable to tolerate food     Symptom specific medications: None     LMP : Only if pertains to symptom. Hysterectomy      Are you pregnant or breast feeding: No     Recent visits (last 3-4 weeks) for same reason or recent surgery:  Yes       PLAN:    Go to the Emergency Department    Patient/Caller agrees to follow recommendations.           Reason for Disposition   Pain is mainly in upper abdomen  (if needed ask: \"is it mainly above the belly button?\")   [1] Pain lasts > 10 minutes AND [2] age > 35 AND [3] at least one cardiac risk factor (e.g., diabetes, high cholesterol, hypertension, obesity, smoker or strong family history of heart disease)    Protocols used: Abdominal Pain - Female-A-AH, Abdominal Pain - Upper-A-AH     [place] F [place] , had gallbladder surgery 2 weeks ago , vomiting , extreme abdominal pain  ----- Message from Zuleyka ARTIS sent at 2024  7:52 PM CST -----  Patient Name: Amy Moore    Specialist or PCP Name: dominick    Symptoms: 47 F [place] , had gallbladder surgery 2 weeks ago , vomiting , extreme abdominal pain , cant keep anything down     Pregnant (females aged 13-60. If Yes, how long?) : no     Call Back # : [phone]    Which State are you currently located in?: [place]     Name of Clinic Site / Acct# : [facility] - [address]     Call arrived during: After Hours     Detail Level: Detailed Attending Only

## 2025-01-27 ENCOUNTER — EMERGENCY (EMERGENCY)
Facility: HOSPITAL | Age: 60
LOS: 1 days | Discharge: HOME | End: 2025-01-27
Admitting: STUDENT IN AN ORGANIZED HEALTH CARE EDUCATION/TRAINING PROGRAM
Payer: MEDICAID

## 2025-01-27 VITALS
RESPIRATION RATE: 18 BRPM | OXYGEN SATURATION: 99 % | TEMPERATURE: 98 F | SYSTOLIC BLOOD PRESSURE: 145 MMHG | DIASTOLIC BLOOD PRESSURE: 71 MMHG | HEART RATE: 66 BPM

## 2025-01-27 VITALS
HEART RATE: 96 BPM | TEMPERATURE: 98 F | SYSTOLIC BLOOD PRESSURE: 169 MMHG | WEIGHT: 149.91 LBS | RESPIRATION RATE: 17 BRPM | DIASTOLIC BLOOD PRESSURE: 97 MMHG | OXYGEN SATURATION: 96 %

## 2025-01-27 DIAGNOSIS — Z98.890 OTHER SPECIFIED POSTPROCEDURAL STATES: Chronic | ICD-10-CM

## 2025-01-27 LAB
ALBUMIN SERPL ELPH-MCNC: 4.1 G/DL — SIGNIFICANT CHANGE UP (ref 3.3–5)
ALP SERPL-CCNC: 83 U/L — SIGNIFICANT CHANGE UP (ref 40–120)
ALT FLD-CCNC: 48 U/L — HIGH (ref 4–41)
ANION GAP SERPL CALC-SCNC: 11 MMOL/L — SIGNIFICANT CHANGE UP (ref 7–14)
AST SERPL-CCNC: 37 U/L — SIGNIFICANT CHANGE UP (ref 4–40)
BASOPHILS # BLD AUTO: 0.1 K/UL — SIGNIFICANT CHANGE UP (ref 0–0.2)
BASOPHILS NFR BLD AUTO: 1.3 % — SIGNIFICANT CHANGE UP (ref 0–2)
BILIRUB SERPL-MCNC: 0.8 MG/DL — SIGNIFICANT CHANGE UP (ref 0.2–1.2)
BUN SERPL-MCNC: 15 MG/DL — SIGNIFICANT CHANGE UP (ref 7–23)
CALCIUM SERPL-MCNC: 8.8 MG/DL — SIGNIFICANT CHANGE UP (ref 8.4–10.5)
CHLORIDE SERPL-SCNC: 107 MMOL/L — SIGNIFICANT CHANGE UP (ref 98–107)
CO2 SERPL-SCNC: 22 MMOL/L — SIGNIFICANT CHANGE UP (ref 22–31)
CREAT SERPL-MCNC: 0.81 MG/DL — SIGNIFICANT CHANGE UP (ref 0.5–1.3)
EGFR: 100 ML/MIN/1.73M2 — SIGNIFICANT CHANGE UP
EOSINOPHIL # BLD AUTO: 0.48 K/UL — SIGNIFICANT CHANGE UP (ref 0–0.5)
EOSINOPHIL NFR BLD AUTO: 6.1 % — HIGH (ref 0–6)
GLUCOSE SERPL-MCNC: 96 MG/DL — SIGNIFICANT CHANGE UP (ref 70–99)
HCT VFR BLD CALC: 42.5 % — SIGNIFICANT CHANGE UP (ref 39–50)
HGB BLD-MCNC: 14.5 G/DL — SIGNIFICANT CHANGE UP (ref 13–17)
IANC: 3.73 K/UL — SIGNIFICANT CHANGE UP (ref 1.8–7.4)
IMM GRANULOCYTES NFR BLD AUTO: 0.4 % — SIGNIFICANT CHANGE UP (ref 0–0.9)
LYMPHOCYTES # BLD AUTO: 2.75 K/UL — SIGNIFICANT CHANGE UP (ref 1–3.3)
LYMPHOCYTES # BLD AUTO: 34.9 % — SIGNIFICANT CHANGE UP (ref 13–44)
MCHC RBC-ENTMCNC: 29.8 PG — SIGNIFICANT CHANGE UP (ref 27–34)
MCHC RBC-ENTMCNC: 34.1 G/DL — SIGNIFICANT CHANGE UP (ref 32–36)
MCV RBC AUTO: 87.4 FL — SIGNIFICANT CHANGE UP (ref 80–100)
MONOCYTES # BLD AUTO: 0.78 K/UL — SIGNIFICANT CHANGE UP (ref 0–0.9)
MONOCYTES NFR BLD AUTO: 9.9 % — SIGNIFICANT CHANGE UP (ref 2–14)
NEUTROPHILS # BLD AUTO: 3.73 K/UL — SIGNIFICANT CHANGE UP (ref 1.8–7.4)
NEUTROPHILS NFR BLD AUTO: 47.4 % — SIGNIFICANT CHANGE UP (ref 43–77)
NRBC # BLD: 0 /100 WBCS — SIGNIFICANT CHANGE UP (ref 0–0)
NRBC # FLD: 0 K/UL — SIGNIFICANT CHANGE UP (ref 0–0)
PLATELET # BLD AUTO: 229 K/UL — SIGNIFICANT CHANGE UP (ref 150–400)
POTASSIUM SERPL-MCNC: 3.9 MMOL/L — SIGNIFICANT CHANGE UP (ref 3.5–5.3)
POTASSIUM SERPL-SCNC: 3.9 MMOL/L — SIGNIFICANT CHANGE UP (ref 3.5–5.3)
PROT SERPL-MCNC: 6.8 G/DL — SIGNIFICANT CHANGE UP (ref 6–8.3)
RBC # BLD: 4.86 M/UL — SIGNIFICANT CHANGE UP (ref 4.2–5.8)
RBC # FLD: 11.9 % — SIGNIFICANT CHANGE UP (ref 10.3–14.5)
SODIUM SERPL-SCNC: 140 MMOL/L — SIGNIFICANT CHANGE UP (ref 135–145)
WBC # BLD: 7.87 K/UL — SIGNIFICANT CHANGE UP (ref 3.8–10.5)
WBC # FLD AUTO: 7.87 K/UL — SIGNIFICANT CHANGE UP (ref 3.8–10.5)

## 2025-01-27 PROCEDURE — 70480 CT ORBIT/EAR/FOSSA W/O DYE: CPT | Mod: 26,59

## 2025-01-27 PROCEDURE — 70450 CT HEAD/BRAIN W/O DYE: CPT | Mod: 26

## 2025-01-27 PROCEDURE — 99285 EMERGENCY DEPT VISIT HI MDM: CPT

## 2025-01-27 RX ORDER — ACETAMINOPHEN 80 MG/.8ML
1000 SOLUTION/ DROPS ORAL ONCE
Refills: 0 | Status: COMPLETED | OUTPATIENT
Start: 2025-01-27 | End: 2025-01-27

## 2025-01-27 RX ORDER — METOCLOPRAMIDE 10 MG/1
10 TABLET ORAL ONCE
Refills: 0 | Status: COMPLETED | OUTPATIENT
Start: 2025-01-27 | End: 2025-01-27

## 2025-01-27 RX ORDER — SODIUM CHLORIDE 9 MG/ML
1000 INJECTION, SOLUTION INTRAMUSCULAR; INTRAVENOUS; SUBCUTANEOUS ONCE
Refills: 0 | Status: COMPLETED | OUTPATIENT
Start: 2025-01-27 | End: 2025-01-27

## 2025-01-27 RX ADMIN — ACETAMINOPHEN 400 MILLIGRAM(S): 80 SOLUTION/ DROPS ORAL at 17:20

## 2025-01-27 RX ADMIN — METOCLOPRAMIDE 10 MILLIGRAM(S): 10 TABLET ORAL at 17:17

## 2025-01-27 RX ADMIN — SODIUM CHLORIDE 1000 MILLILITER(S): 9 INJECTION, SOLUTION INTRAMUSCULAR; INTRAVENOUS; SUBCUTANEOUS at 17:17

## 2025-01-27 NOTE — ED PROVIDER NOTE - PROGRESS NOTE DETAILS
Patient Seen resting comfortably in no acute distress.  Patient reports improvement of symptoms at this time.  Labs acutely unremarkable.  CT orbit with no acute fractures, CT head with no acute intracranial hemorrhage or any other concerning findings.  Ophthalmology seen and evaluated patient at bedside in which they found no acute findings and patient can follow-up with Dr. Stover on 2/11/2025.  Labs and imaging discussed and reviewed with patient.  Strict return precautions given upon discharge.
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
## 2025-01-27 NOTE — ED ADULT NURSE NOTE - NSFALLRISKINTERV_ED_ALL_ED
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
## 2025-01-27 NOTE — ED PROVIDER NOTE - NSFOLLOWUPINSTRUCTIONS_ED_ALL_ED_FT
You were seen in our department for Headache   Follow up with your Primary Care Doctor  in 48-72 hours for further monitoring.  Follow up with  Dr. Walsh on 02/11/2025    Please follow up [facility] Department of Ophthalmology within 1 week of after discharge at:    [address] [place] [address]  [phone]    if you develop any chest pain, dizziness, high fevers, weakness, numbness, tingling, vision changes, or any worsening symptoms return to our ED for evaluation.
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
## 2025-01-27 NOTE — ED PROVIDER NOTE - PHYSICAL EXAMINATION
Vitals signed reviewed  General: Well appearing, NAD  HEENT: NC/AT, PERRLA, EOM intact with no pain, MMM    OD: 20/70     OS: 20/20   Neck: full ROM, no trachea deviation  Heart: RRR, normal s1/s2  Lungs: CTAB, normal WOB, no wheezing, rales, or rhonchi   Abdomen: Soft, non-distended, non-tender, no CVA tenderness , rebounding or guarding  Neuro: CN II-XII intact. no sensory/motor deficits    finger to nose intact
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
## 2025-01-27 NOTE — ED PROVIDER NOTE - PATIENT PORTAL LINK FT
You can access the FollowMyHealth Patient Portal offered by [facility] by registering at the following website: [email or web address]. By joining [employer]’s FollowMyHealth portal, you will also be able to view your health information using other applications (apps) compatible with our system.
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
## 2025-05-18 ENCOUNTER — EMERGENCY (EMERGENCY)
Facility: HOSPITAL | Age: 60
LOS: 1 days | End: 2025-05-18
Attending: STUDENT IN AN ORGANIZED HEALTH CARE EDUCATION/TRAINING PROGRAM | Admitting: STUDENT IN AN ORGANIZED HEALTH CARE EDUCATION/TRAINING PROGRAM
Payer: COMMERCIAL

## 2025-05-18 VITALS
DIASTOLIC BLOOD PRESSURE: 89 MMHG | HEART RATE: 78 BPM | OXYGEN SATURATION: 98 % | TEMPERATURE: 98 F | SYSTOLIC BLOOD PRESSURE: 137 MMHG | RESPIRATION RATE: 18 BRPM | WEIGHT: 154.98 LBS

## 2025-05-18 VITALS
DIASTOLIC BLOOD PRESSURE: 86 MMHG | OXYGEN SATURATION: 100 % | HEART RATE: 65 BPM | SYSTOLIC BLOOD PRESSURE: 137 MMHG | TEMPERATURE: 98 F | RESPIRATION RATE: 18 BRPM

## 2025-05-18 DIAGNOSIS — Z98.890 OTHER SPECIFIED POSTPROCEDURAL STATES: Chronic | ICD-10-CM

## 2025-05-18 LAB
ALBUMIN SERPL ELPH-MCNC: 4.3 G/DL — SIGNIFICANT CHANGE UP (ref 3.3–5)
ALP SERPL-CCNC: 103 U/L — SIGNIFICANT CHANGE UP (ref 40–120)
ALT FLD-CCNC: 19 U/L — SIGNIFICANT CHANGE UP (ref 4–41)
ANION GAP SERPL CALC-SCNC: 14 MMOL/L — SIGNIFICANT CHANGE UP (ref 7–14)
APTT BLD: 37.7 SEC — HIGH (ref 26.1–36.8)
AST SERPL-CCNC: 25 U/L — SIGNIFICANT CHANGE UP (ref 4–40)
BASOPHILS # BLD AUTO: 0.07 K/UL — SIGNIFICANT CHANGE UP (ref 0–0.2)
BASOPHILS NFR BLD AUTO: 1.1 % — SIGNIFICANT CHANGE UP (ref 0–2)
BILIRUB SERPL-MCNC: 1.2 MG/DL — SIGNIFICANT CHANGE UP (ref 0.2–1.2)
BLD GP AB SCN SERPL QL: NEGATIVE — SIGNIFICANT CHANGE UP
BUN SERPL-MCNC: 13 MG/DL — SIGNIFICANT CHANGE UP (ref 7–23)
CALCIUM SERPL-MCNC: 9.2 MG/DL — SIGNIFICANT CHANGE UP (ref 8.4–10.5)
CHLORIDE SERPL-SCNC: 106 MMOL/L — SIGNIFICANT CHANGE UP (ref 98–107)
CO2 SERPL-SCNC: 20 MMOL/L — LOW (ref 22–31)
CREAT SERPL-MCNC: 0.83 MG/DL — SIGNIFICANT CHANGE UP (ref 0.5–1.3)
EGFR: 99 ML/MIN/1.73M2 — SIGNIFICANT CHANGE UP
EGFR: 99 ML/MIN/1.73M2 — SIGNIFICANT CHANGE UP
EOSINOPHIL # BLD AUTO: 0.14 K/UL — SIGNIFICANT CHANGE UP (ref 0–0.5)
EOSINOPHIL NFR BLD AUTO: 2.1 % — SIGNIFICANT CHANGE UP (ref 0–6)
GAS PNL BLDV: SIGNIFICANT CHANGE UP
GLUCOSE SERPL-MCNC: 103 MG/DL — HIGH (ref 70–99)
HCT VFR BLD CALC: 39.2 % — SIGNIFICANT CHANGE UP (ref 39–50)
HCT VFR BLD CALC: 42.5 % — SIGNIFICANT CHANGE UP (ref 39–50)
HGB BLD-MCNC: 13.7 G/DL — SIGNIFICANT CHANGE UP (ref 13–17)
HGB BLD-MCNC: 14.7 G/DL — SIGNIFICANT CHANGE UP (ref 13–17)
IANC: 3.84 K/UL — SIGNIFICANT CHANGE UP (ref 1.8–7.4)
IMM GRANULOCYTES NFR BLD AUTO: 0.3 % — SIGNIFICANT CHANGE UP (ref 0–0.9)
INR BLD: 1.11 RATIO — SIGNIFICANT CHANGE UP (ref 0.85–1.16)
LIDOCAIN IGE QN: 23 U/L — SIGNIFICANT CHANGE UP (ref 7–60)
LYMPHOCYTES # BLD AUTO: 2.11 K/UL — SIGNIFICANT CHANGE UP (ref 1–3.3)
LYMPHOCYTES # BLD AUTO: 32.1 % — SIGNIFICANT CHANGE UP (ref 13–44)
MCHC RBC-ENTMCNC: 29.6 PG — SIGNIFICANT CHANGE UP (ref 27–34)
MCHC RBC-ENTMCNC: 29.9 PG — SIGNIFICANT CHANGE UP (ref 27–34)
MCHC RBC-ENTMCNC: 34.6 G/DL — SIGNIFICANT CHANGE UP (ref 32–36)
MCHC RBC-ENTMCNC: 34.9 G/DL — SIGNIFICANT CHANGE UP (ref 32–36)
MCV RBC AUTO: 85.6 FL — SIGNIFICANT CHANGE UP (ref 80–100)
MCV RBC AUTO: 85.7 FL — SIGNIFICANT CHANGE UP (ref 80–100)
MONOCYTES # BLD AUTO: 0.4 K/UL — SIGNIFICANT CHANGE UP (ref 0–0.9)
MONOCYTES NFR BLD AUTO: 6.1 % — SIGNIFICANT CHANGE UP (ref 2–14)
NEUTROPHILS # BLD AUTO: 3.84 K/UL — SIGNIFICANT CHANGE UP (ref 1.8–7.4)
NEUTROPHILS NFR BLD AUTO: 58.3 % — SIGNIFICANT CHANGE UP (ref 43–77)
NRBC # BLD AUTO: 0 K/UL — SIGNIFICANT CHANGE UP (ref 0–0)
NRBC # BLD AUTO: 0 K/UL — SIGNIFICANT CHANGE UP (ref 0–0)
NRBC # FLD: 0 K/UL — SIGNIFICANT CHANGE UP (ref 0–0)
NRBC # FLD: 0 K/UL — SIGNIFICANT CHANGE UP (ref 0–0)
NRBC BLD AUTO-RTO: 0 /100 WBCS — SIGNIFICANT CHANGE UP (ref 0–0)
NRBC BLD AUTO-RTO: 0 /100 WBCS — SIGNIFICANT CHANGE UP (ref 0–0)
PLATELET # BLD AUTO: 233 K/UL — SIGNIFICANT CHANGE UP (ref 150–400)
PLATELET # BLD AUTO: 241 K/UL — SIGNIFICANT CHANGE UP (ref 150–400)
POTASSIUM SERPL-MCNC: 4.1 MMOL/L — SIGNIFICANT CHANGE UP (ref 3.5–5.3)
POTASSIUM SERPL-SCNC: 4.1 MMOL/L — SIGNIFICANT CHANGE UP (ref 3.5–5.3)
PROT SERPL-MCNC: 7 G/DL — SIGNIFICANT CHANGE UP (ref 6–8.3)
PROTHROM AB SERPL-ACNC: 12.9 SEC — SIGNIFICANT CHANGE UP (ref 9.9–13.4)
RBC # BLD: 4.58 M/UL — SIGNIFICANT CHANGE UP (ref 4.2–5.8)
RBC # BLD: 4.96 M/UL — SIGNIFICANT CHANGE UP (ref 4.2–5.8)
RBC # FLD: 11.9 % — SIGNIFICANT CHANGE UP (ref 10.3–14.5)
RBC # FLD: 12 % — SIGNIFICANT CHANGE UP (ref 10.3–14.5)
RH IG SCN BLD-IMP: POSITIVE — SIGNIFICANT CHANGE UP
SODIUM SERPL-SCNC: 140 MMOL/L — SIGNIFICANT CHANGE UP (ref 135–145)
WBC # BLD: 6.58 K/UL — SIGNIFICANT CHANGE UP (ref 3.8–10.5)
WBC # BLD: 8.45 K/UL — SIGNIFICANT CHANGE UP (ref 3.8–10.5)
WBC # FLD AUTO: 6.58 K/UL — SIGNIFICANT CHANGE UP (ref 3.8–10.5)
WBC # FLD AUTO: 8.45 K/UL — SIGNIFICANT CHANGE UP (ref 3.8–10.5)

## 2025-05-18 PROCEDURE — 99285 EMERGENCY DEPT VISIT HI MDM: CPT

## 2025-05-18 PROCEDURE — 74177 CT ABD & PELVIS W/CONTRAST: CPT | Mod: 26

## 2025-05-18 RX ORDER — ACETAMINOPHEN 500 MG/5ML
1000 LIQUID (ML) ORAL ONCE
Refills: 0 | Status: COMPLETED | OUTPATIENT
Start: 2025-05-18 | End: 2025-05-18

## 2025-05-18 RX ADMIN — Medication 400 MILLIGRAM(S): at 15:35

## 2025-05-18 NOTE — ED PROVIDER NOTE - NS_EDPROVIDERDISPOUSERTYPE_ED_A_ED
Spoke with Lesli at service to inform  of consult Attending Attestation (For Attendings USE Only)...

## 2025-05-18 NOTE — ED ADULT NURSE REASSESSMENT NOTE - NS ED NURSE REASSESS COMMENT FT1
Pt  received from primary RN stable. Respirations even and unlabored. Pt expresses slight discomfort at this time, MD made aware and medications given as per current care plan. Awaiting CT results.  Safety precautions in place.

## 2025-05-18 NOTE — ED PROVIDER NOTE - NSFOLLOWUPINSTRUCTIONS_ED_ALL_ED_FT
You were seen in the emergency department today for rectal bleeding.  Your CT scan was unremarkable.  Your repeat blood count was stable.    We are giving you GI follow-up.  They will call you to schedule appointment.  Please follow-up with them.    It is important continue to monitor your symptoms.  If you become lightheaded, dizzy, begin passing large volumes of clots, or have any other symptoms you find concerning, please return to the emergency department for further evaluation.

## 2025-05-18 NOTE — ED PROVIDER NOTE - OBJECTIVE STATEMENT
60-year-old no medical history here for chief complaint of bloody stools for 2 weeks.  Patient states 2 significant onset of bright red blood in the toilet about 10 times per day.  Patient states he started developing weakness and tingling in the lower extremities a week after the bloody bowel movement started.  This a.m. of presentation patient states he defecated clots.  This associated with burning all throughout the abdomen.  Patient last colonoscopy 10 years prior.  Patient denying chest pain, shortness of breath, nausea, vomiting, recent travel, recent URI, fevers.

## 2025-05-18 NOTE — ED ADULT TRIAGE NOTE - CHIEF COMPLAINT QUOTE
Pt. c/o rectal bleeding, weakness, dizziness, tingling in feet, abdominal pain and burning sensation x 2 wks. Sent from urgent care for evaluation. No use of blood thinners.

## 2025-05-18 NOTE — ED PROVIDER NOTE - PROGRESS NOTE DETAILS
Manuel Irvin DO (PGY1): Received signout.  In brief, patient is a 60-year-old male, no significant past medical history, presenting to ED for 2 weeks of BRBPR with associated cramping.  Patient signed up to me pending repeat CBC result.  Labs are independently interpreted by me, initial CBC with a H&H of 14.7/42.5, repeat CBC with H&H of 13.7/39.2.  Coags nonactionable, CMP nonactionable.  VBG nonactionable.  CT abdomen pelvis nonactionable.  Patient stable for discharge at this time with outpatient follow-up.

## 2025-05-18 NOTE — ED PROVIDER NOTE - PATIENT PORTAL LINK FT
You can access the FollowMyHealth Patient Portal offered by [facility] by registering at the following website: [email or web address]. By joining [facility]’s FollowMyHealth portal, you will also be able to view your health information using other applications (apps) compatible with our system.

## 2025-05-18 NOTE — ED PROVIDER NOTE - ATTENDING CONTRIBUTION TO CARE
60-year-old male no significant past medical history presents for bloody stools approximately 2 weeks.  Reports associated burning throughout lower abdomen and extremities.  Patient denies nausea vomiting.  Patient denies fevers chills chest pain shortness of breath.  Patient denies antiplatelet or anticoagulation use.  Exam as above,Abdomen soft nontender    Plan: Labs, CT, reassess.  Patient signed out at end of my shift.

## 2025-05-18 NOTE — ED PROVIDER NOTE - CLINICAL SUMMARY MEDICAL DECISION MAKING FREE TEXT BOX
60-year-old no medical history here for chief complaint of bloody stools for 2 weeks. Physical exam significant for diffuse abdominal pain upon palpation.  Differential includes diverticulitis, infectious colitis, GI malignancy, internal hemorrhoids.  Will obtain CBC, CMP, blood gas venous, type and screen, coag studies.  Will obtain CT abdomen pelvis to evaluate for GI bleed or other etiologies of abdominal pain.

## 2025-05-18 NOTE — ED ADULT NURSE NOTE - NSFALLUNIVINTERV_ED_ALL_ED
Bed/Stretcher in lowest position, wheels locked, appropriate side rails in place/Call bell, personal items and telephone in reach/Instruct patient to call for assistance before getting out of bed/chair/stretcher/Non-slip footwear applied when patient is off stretcher/[place] to call system/Physically safe environment - no spills, clutter or unnecessary equipment/Purposeful proactive rounding/Room/bathroom lighting operational, light cord in reach

## 2025-05-18 NOTE — ED ADULT NURSE NOTE - OBJECTIVE STATEMENT
pt coming from home c/o 2 weeks rectal bleeding with weakness, numbness/ tingling in LE and hands. today pt had large BM with  bright red blood. pt denies sob, dizziness, chest pain. denies past med hx. breathing even and unlabored, speaking full clear sentences #20 g piv placed in left ac, labs sent. SR on CM noted, call bell with pt, safety maintained.

## 2025-05-18 NOTE — ED PROVIDER NOTE - PHYSICAL EXAMINATION
General: well appearing, interactive, well nourished, no apparent distress, ncat  HEENT: EOMI, PERRLA, normal mucosa, normal oropharynx, no lesions on the lips or on oral mucosa, normal external ear  Neck: supple, no lymphadenopathy, full range of motion, no nuchal rigidity  CV: RRR, normal S1 and S2 with no murmur, capillary refill less than two seconds  Resp: lungs CTA b/l, good aeration bilaterally, symmetric chest wall   Abd: non-distended, soft, non-tender,  Rectal: No hemorrhoids appreciated, no blood appreciated on rectal exam. : Chaperoned by jane bryan  : no CVA tenderness  MSK: full range of motion, no cyanosis, no edema, no clubbing, no immobility  Neuro: CN II-XII grossly intact, muscle strength 5/5 in all extremities, normal gait  Skin: no rashes, skin intact

## 2025-06-05 ENCOUNTER — APPOINTMENT (OUTPATIENT)
Dept: GASTROENTEROLOGY | Facility: CLINIC | Age: 60
End: 2025-06-05

## 2025-06-05 ENCOUNTER — APPOINTMENT (OUTPATIENT)
Dept: GASTROENTEROLOGY | Facility: CLINIC | Age: 60
End: 2025-06-05
Payer: COMMERCIAL

## 2025-06-05 VITALS
OXYGEN SATURATION: 99 % | HEART RATE: 90 BPM | BODY MASS INDEX: 23.54 KG/M2 | DIASTOLIC BLOOD PRESSURE: 88 MMHG | SYSTOLIC BLOOD PRESSURE: 137 MMHG | WEIGHT: 150 LBS | TEMPERATURE: 97.2 F | HEIGHT: 67 IN

## 2025-06-05 DIAGNOSIS — K92.1 MELENA: ICD-10-CM

## 2025-06-05 PROCEDURE — 99204 OFFICE O/P NEW MOD 45 MIN: CPT

## 2025-06-05 RX ORDER — POLYETHYLENE GLYCOL 3350 17 G/17G
17 POWDER, FOR SOLUTION ORAL
Qty: 1 | Refills: 0 | Status: ACTIVE | COMMUNITY
Start: 2025-06-05 | End: 1900-01-01

## 2025-06-06 LAB
HCT VFR BLD CALC: 41.7 %
HGB BLD-MCNC: 13.8 G/DL
MCHC RBC-ENTMCNC: 29.4 PG
MCHC RBC-ENTMCNC: 33.1 G/DL
MCV RBC AUTO: 88.9 FL
PLATELET # BLD AUTO: 243 K/UL
RBC # BLD: 4.69 M/UL
RBC # FLD: 12.4 %
WBC # FLD AUTO: 6.07 K/UL

## 2025-06-12 ENCOUNTER — TRANSCRIPTION ENCOUNTER (OUTPATIENT)
Age: 60
End: 2025-06-12

## 2025-06-12 ENCOUNTER — APPOINTMENT (OUTPATIENT)
Dept: GASTROENTEROLOGY | Facility: HOSPITAL | Age: 60
End: 2025-06-12

## 2025-06-12 ENCOUNTER — OUTPATIENT (OUTPATIENT)
Dept: OUTPATIENT SERVICES | Facility: HOSPITAL | Age: 60
LOS: 1 days | End: 2025-06-12
Payer: COMMERCIAL

## 2025-06-12 VITALS
OXYGEN SATURATION: 99 % | HEART RATE: 75 BPM | DIASTOLIC BLOOD PRESSURE: 86 MMHG | SYSTOLIC BLOOD PRESSURE: 148 MMHG | RESPIRATION RATE: 23 BRPM

## 2025-06-12 VITALS
TEMPERATURE: 98 F | RESPIRATION RATE: 12 BRPM | WEIGHT: 149.91 LBS | OXYGEN SATURATION: 98 % | HEART RATE: 82 BPM | DIASTOLIC BLOOD PRESSURE: 88 MMHG | SYSTOLIC BLOOD PRESSURE: 136 MMHG | HEIGHT: 66 IN

## 2025-06-12 DIAGNOSIS — Z98.890 OTHER SPECIFIED POSTPROCEDURAL STATES: Chronic | ICD-10-CM

## 2025-06-12 DIAGNOSIS — K92.1 MELENA: ICD-10-CM

## 2025-06-12 PROCEDURE — 45378 DIAGNOSTIC COLONOSCOPY: CPT

## 2025-06-12 NOTE — ASU DISCHARGE PLAN (ADULT/PEDIATRIC) - NS MD DC FALL RISK RISK
For information on Fall & Injury Prevention, visit: [email or web address] OR  [email or web address] OR  https://www.cdc.gov/steadi/patient.html

## 2025-06-12 NOTE — ASU DISCHARGE PLAN (ADULT/PEDIATRIC) - FINANCIAL ASSISTANCE
[facility] provides services at a reduced cost to those who are determined to be eligible through [facility]’s financial assistance program. Information regarding [facility]’s financial assistance program can be found by going to [email or web address] or by calling [phone].

## 2025-06-30 ENCOUNTER — NON-APPOINTMENT (OUTPATIENT)
Age: 60
End: 2025-06-30

## 2025-06-30 ENCOUNTER — APPOINTMENT (OUTPATIENT)
Dept: SURGERY | Facility: CLINIC | Age: 60
End: 2025-06-30
Payer: COMMERCIAL

## 2025-06-30 VITALS
DIASTOLIC BLOOD PRESSURE: 77 MMHG | HEIGHT: 67 IN | SYSTOLIC BLOOD PRESSURE: 144 MMHG | BODY MASS INDEX: 23.54 KG/M2 | HEART RATE: 79 BPM | OXYGEN SATURATION: 97 % | WEIGHT: 150 LBS

## 2025-06-30 PROBLEM — K64.8 BLEEDING INTERNAL HEMORRHOIDS: Status: ACTIVE | Noted: 2025-06-30

## 2025-06-30 PROBLEM — Z56.0 UNEMPLOYED: Status: ACTIVE | Noted: 2025-06-30

## 2025-06-30 PROCEDURE — 99204 OFFICE O/P NEW MOD 45 MIN: CPT | Mod: 25

## 2025-06-30 PROCEDURE — 46600 DIAGNOSTIC ANOSCOPY SPX: CPT

## 2025-07-02 ENCOUNTER — OUTPATIENT (OUTPATIENT)
Dept: OUTPATIENT SERVICES | Facility: HOSPITAL | Age: 60
LOS: 1 days | End: 2025-07-02
Payer: COMMERCIAL

## 2025-07-02 VITALS
RESPIRATION RATE: 16 BRPM | DIASTOLIC BLOOD PRESSURE: 83 MMHG | SYSTOLIC BLOOD PRESSURE: 150 MMHG | WEIGHT: 149.91 LBS | HEIGHT: 65 IN | HEART RATE: 65 BPM | OXYGEN SATURATION: 96 % | TEMPERATURE: 98 F

## 2025-07-02 DIAGNOSIS — Z98.890 OTHER SPECIFIED POSTPROCEDURAL STATES: Chronic | ICD-10-CM

## 2025-07-02 DIAGNOSIS — K64.8 OTHER HEMORRHOIDS: ICD-10-CM

## 2025-07-02 DIAGNOSIS — Z01.818 ENCOUNTER FOR OTHER PREPROCEDURAL EXAMINATION: ICD-10-CM

## 2025-07-02 LAB
ANION GAP SERPL CALC-SCNC: 12 MMOL/L — SIGNIFICANT CHANGE UP (ref 5–17)
BUN SERPL-MCNC: 14 MG/DL — SIGNIFICANT CHANGE UP (ref 7–23)
CALCIUM SERPL-MCNC: 9.4 MG/DL — SIGNIFICANT CHANGE UP (ref 8.4–10.5)
CHLORIDE SERPL-SCNC: 106 MMOL/L — SIGNIFICANT CHANGE UP (ref 96–108)
CO2 SERPL-SCNC: 24 MMOL/L — SIGNIFICANT CHANGE UP (ref 22–31)
CREAT SERPL-MCNC: 0.84 MG/DL — SIGNIFICANT CHANGE UP (ref 0.5–1.3)
EGFR: 99 ML/MIN/1.73M2 — SIGNIFICANT CHANGE UP
EGFR: 99 ML/MIN/1.73M2 — SIGNIFICANT CHANGE UP
GLUCOSE SERPL-MCNC: 87 MG/DL — SIGNIFICANT CHANGE UP (ref 70–99)
HCT VFR BLD CALC: 43 % — SIGNIFICANT CHANGE UP (ref 39–50)
HGB BLD-MCNC: 14.2 G/DL — SIGNIFICANT CHANGE UP (ref 13–17)
MCHC RBC-ENTMCNC: 29.5 PG — SIGNIFICANT CHANGE UP (ref 27–34)
MCHC RBC-ENTMCNC: 33 G/DL — SIGNIFICANT CHANGE UP (ref 32–36)
MCV RBC AUTO: 89.4 FL — SIGNIFICANT CHANGE UP (ref 80–100)
NRBC # BLD AUTO: 0 K/UL — SIGNIFICANT CHANGE UP (ref 0–0)
NRBC # FLD: 0 K/UL — SIGNIFICANT CHANGE UP (ref 0–0)
NRBC BLD AUTO-RTO: 0 /100 WBCS — SIGNIFICANT CHANGE UP (ref 0–0)
PLATELET # BLD AUTO: 227 K/UL — SIGNIFICANT CHANGE UP (ref 150–400)
PMV BLD: 10.8 FL — SIGNIFICANT CHANGE UP (ref 7–13)
POTASSIUM SERPL-MCNC: 4 MMOL/L — SIGNIFICANT CHANGE UP (ref 3.5–5.3)
POTASSIUM SERPL-SCNC: 4 MMOL/L — SIGNIFICANT CHANGE UP (ref 3.5–5.3)
RBC # BLD: 4.81 M/UL — SIGNIFICANT CHANGE UP (ref 4.2–5.8)
RBC # FLD: 12.4 % — SIGNIFICANT CHANGE UP (ref 10.3–14.5)
SODIUM SERPL-SCNC: 142 MMOL/L — SIGNIFICANT CHANGE UP (ref 135–145)
WBC # BLD: 7.3 K/UL — SIGNIFICANT CHANGE UP (ref 3.8–10.5)
WBC # FLD AUTO: 7.3 K/UL — SIGNIFICANT CHANGE UP (ref 3.8–10.5)

## 2025-07-02 PROCEDURE — G0463: CPT

## 2025-07-02 PROCEDURE — 85027 COMPLETE CBC AUTOMATED: CPT

## 2025-07-02 PROCEDURE — 80048 BASIC METABOLIC PNL TOTAL CA: CPT

## 2025-07-02 RX ORDER — SODIUM CHLORIDE 9 G/1000ML
1000 INJECTION, SOLUTION INTRAVENOUS
Refills: 0 | Status: DISCONTINUED | OUTPATIENT
Start: 2025-07-08 | End: 2025-07-22

## 2025-07-02 NOTE — H&P PST ADULT - NSICDXPASTMEDICALHX_GEN_ALL_CORE_FT
PAST MEDICAL HISTORY:  Back pain     Eczema      PAST MEDICAL HISTORY:  Back pain     Eczema     Glaucoma, right eye     HLD (hyperlipidemia)

## 2025-07-02 NOTE — H&P PST ADULT - NSICDXPASTSURGICALHX_GEN_ALL_CORE_FT
PAST SURGICAL HISTORY:  S/P left inguinal hernia repair      PAST SURGICAL HISTORY:  History of right inguinal hernia repair     S/P left inguinal hernia repair

## 2025-07-02 NOTE — H&P PST ADULT - PROBLEM SELECTOR PLAN 1
Scheduled for excisional hemorrhoidectomy on 7/8/25 with Dr. Winn.   Pre-operative instructions given.  Labs: cbc, bmp  drawn in PST.

## 2025-07-02 NOTE — H&P PST ADULT - HISTORY OF PRESENT ILLNESS
61 y/o male with  PMHx  Hemorrohoids, and rectal bleeding.  S/p Colonoscopy from 06/12/25- Few L tics. 2 large internal hemorrhoids, one with evidence of recentbleeding. No polyps.    Presenting to PST Prior to scheduled excisional hemorrhoidectomy on 7/8/25 with Dr. Winn.          61 y/o male with  PMHx  Hemorrhoids,  HLD (non-complaint w/ meds)  and rectal bleeding.  S/p Colonoscopy from 06/12/25- found to have internal Hemmorrhoids, No polyps. Patient endorses ongoing Rectal bleeding  with bm's daily, endorses weakness and fatigue.  Decrease in appetite over the past few months (fearful to eat 2/2 painful BM's and rectal bleeding), Lost 10 pounds over the past 3 months. Denies fever, chills, night sweats, chestpain or palpitations.       Presenting to PST Prior to scheduled excisional hemorrhoidectomy on 7/8/25 with Dr. Winn.

## 2025-07-02 NOTE — H&P PST ADULT - ASSESSMENT
DASI score: 8.23  DASI activity: Can walk 1-2 blocks, climb 1-2 flights of stairs.   Loose teeth or denture: denies   DASI score: 8.23  DASI activity: Can walk 1-2 blocks, climb 1-2 flights of stairs. Gardening without chest pain or palpitations.   Loose teeth or denture: denies

## 2025-07-02 NOTE — H&P PST ADULT - PRIMARY CARE PROVIDER
Dr. Alas, [facility] [phone] Dr. Alas, [facility] [phone] last visit M/E 7/1/25

## 2025-07-02 NOTE — H&P PST ADULT - PHONE #
Chief Complaint   Patient presents with    Skin Problem     Pt seen @ the request of Dr. Michael Choudhury evaluate lesion on umbilicus. 1. Have you been to the ER, urgent care clinic since your last visit? Hospitalized since your last visit? new consult    2. Have you seen or consulted any other health care providers outside of the [address] [place] [address] since your last visit? Include any pap smears or colon screening. New consult      Discussed advanced directive. Patient states that she does  have an advanced directive. [phone]

## 2025-07-08 ENCOUNTER — TRANSCRIPTION ENCOUNTER (OUTPATIENT)
Age: 60
End: 2025-07-08

## 2025-07-08 ENCOUNTER — APPOINTMENT (OUTPATIENT)
Dept: SURGERY | Facility: HOSPITAL | Age: 60
End: 2025-07-08

## 2025-07-08 ENCOUNTER — OUTPATIENT (OUTPATIENT)
Dept: OUTPATIENT SERVICES | Facility: HOSPITAL | Age: 60
LOS: 1 days | End: 2025-07-08
Payer: COMMERCIAL

## 2025-07-08 ENCOUNTER — RESULT REVIEW (OUTPATIENT)
Age: 60
End: 2025-07-08

## 2025-07-08 VITALS
OXYGEN SATURATION: 100 % | DIASTOLIC BLOOD PRESSURE: 65 MMHG | RESPIRATION RATE: 16 BRPM | HEART RATE: 55 BPM | SYSTOLIC BLOOD PRESSURE: 121 MMHG

## 2025-07-08 VITALS
HEIGHT: 65 IN | RESPIRATION RATE: 17 BRPM | WEIGHT: 149.91 LBS | DIASTOLIC BLOOD PRESSURE: 89 MMHG | TEMPERATURE: 98 F | OXYGEN SATURATION: 99 % | HEART RATE: 60 BPM | SYSTOLIC BLOOD PRESSURE: 155 MMHG

## 2025-07-08 DIAGNOSIS — Z98.890 OTHER SPECIFIED POSTPROCEDURAL STATES: Chronic | ICD-10-CM

## 2025-07-08 DIAGNOSIS — K64.8 OTHER HEMORRHOIDS: ICD-10-CM

## 2025-07-08 PROCEDURE — 88304 TISSUE EXAM BY PATHOLOGIST: CPT | Mod: 26

## 2025-07-08 PROCEDURE — 46260 REMOVE IN/EX HEM GROUPS 2+: CPT

## 2025-07-08 PROCEDURE — 46999 UNLISTED PROCEDURE ANUS: CPT

## 2025-07-08 PROCEDURE — C1889: CPT

## 2025-07-08 PROCEDURE — 88304 TISSUE EXAM BY PATHOLOGIST: CPT

## 2025-07-08 DEVICE — SURGICEL FIBRILLAR 2 X 4": Type: IMPLANTABLE DEVICE | Status: FUNCTIONAL

## 2025-07-08 RX ORDER — LIDOCAINE HCL/PF 10 MG/ML
0.2 VIAL (ML) INJECTION ONCE
Refills: 0 | Status: COMPLETED | OUTPATIENT
Start: 2025-07-08 | End: 2025-07-08

## 2025-07-08 RX ORDER — ONDANSETRON HCL/PF 4 MG/2 ML
4 VIAL (ML) INJECTION ONCE
Refills: 0 | Status: DISCONTINUED | OUTPATIENT
Start: 2025-07-08 | End: 2025-07-22

## 2025-07-08 RX ORDER — ROSUVASTATIN CALCIUM 5 MG/1
1 TABLET, FILM COATED ORAL
Refills: 0 | DISCHARGE

## 2025-07-08 RX ORDER — METRONIDAZOLE 7.5 MG/G
1 GEL VAGINAL
Qty: 1 | Refills: 0
Start: 2025-07-08

## 2025-07-08 RX ORDER — OXYCODONE HYDROCHLORIDE 30 MG/1
1 TABLET ORAL
Qty: 15 | Refills: 0
Start: 2025-07-08

## 2025-07-08 RX ORDER — FENTANYL CITRATE-0.9 % NACL/PF 100MCG/2ML
25 SYRINGE (ML) INTRAVENOUS
Refills: 0 | Status: DISCONTINUED | OUTPATIENT
Start: 2025-07-08 | End: 2025-07-08

## 2025-07-08 RX ADMIN — Medication 3 MILLILITER(S): at 09:28

## 2025-07-08 RX ADMIN — SODIUM CHLORIDE 100 MILLILITER(S): 9 INJECTION, SOLUTION INTRAVENOUS at 09:42

## 2025-07-08 NOTE — PRE-ANESTHESIA EVALUATION ADULT - NSANTHPMHFT_GEN_ALL_CORE
59 y/o male with  PMHx  Hemorrhoids, HLD (non-complaint w/ meds)  and rectal bleeding.  S/p Colonoscopy from 06/12/25- found to have internal Hemmorrhoids. Patient endorses ongoing Rectal bleeding with bm's daily, endorses weakness and fatigue. Presenting for scheduled excisional hemorrhoidectomy

## 2025-07-08 NOTE — ASU PATIENT PROFILE, ADULT - NSSUBSTANCEUSE_GEN_ALL_CORE_SD
Daily Note     Today's date: 3/12/2021  Patient name: Cruzito Demarco  : 1959  MRN: [id number]  Referring provider: Angelic Watson MD  Dx:   Encounter Diagnosis     ICD-10-CM    1  Cervical radiculopathy  M54 12    2  S/P cervical spinal fusion  Z98 1                   Subjective: The right side of the neck has been bothering me for the last 4-5 days  Not sure why its been hurting  Objective: See treatment diary below      Assessment: Tolerated treatment well  Difficulty with lateral flexion R/L as well as rotation right due to c-spine pain  New TE added for strengthening  No symptoms noted with this  Patient would benefit from continued PT      Plan: Continue per plan of care        Precautions:  S/P Cervical Fusion         Manuals 3-12-21 1/29/21 2/3/2021 2/10/21 2/17/21                                   Neuro Re-Ed                         Ther Ex        c-spine AROM 20x  Ea  3" lateral flexion and rotation right held 20x3" ea  20x ea 3" 20x ea dir   Shrugs/rolls 20x  3" 20x  3"  20x ea  3" 20x ea   retractions 20x  3" 20x  3"  20x  3" 20x   Posture slouch/correct        nustep L5  12' L5 12' L5 12 min L5 12' L5 12'   UBE  7' Alt 80 speed 4'  Alt    6'  alt 4' alt  (80 speed)   Cervical isometrics  15x  5" ea 15x5" ea   15x ea dir 5" 15x ea dir 5" hold   Lat Pulldown 15#  30x       Seated Row 10#  30x       Chest Press  10#  20x               T-band Press     20x  Red   TB MTP/LTP     20x Red   TB T & Y                  HEP        Ther Activity                        Gait Training                        Modalities        MHP/CP MHP 5' mid-session Declined  Declined  Declined Declined
caffeine

## 2025-07-08 NOTE — BRIEF OPERATIVE NOTE - NSICDXBRIEFPROCEDURE_GEN_ALL_CORE_FT
PROCEDURES:  Hemorrhoidectomy, internal, involving 2 or more anal columns 08-Jul-2025 13:23:08  Elan Castorena

## 2025-07-08 NOTE — PRE-ANESTHESIA EVALUATION ADULT - BSA (M2)
Provider Staff:  Action required for this patient    Requires labs      Please see care gap opportunities below in Care Due Message.    Thanks!  [facility]     Appointments      Date Provider   Last Visit   4/3/2024 Jovita Goss MD   Next Visit   8/8/2024 Jovita Goss MD     Refill Decision Note   Joyce Cano  is requesting a refill authorization.  Brief Assessment and Rationale for Refill:  Approve     Medication Therapy Plan:         Comments:     Note composed:12:06 PM 07/05/2024            1.75

## 2025-07-08 NOTE — ASU DISCHARGE PLAN (ADULT/PEDIATRIC) - CARE PROVIDER_API CALL
Azra Winn  Surgery (General Surgery)  [address], [address]  [place] [address]  Phone: [phone]  Fax: [phone]  Follow Up Time: 2 weeks

## 2025-07-08 NOTE — ASU PATIENT PROFILE, ADULT - NSICDXPASTSURGICALHX_GEN_ALL_CORE_FT
PAST SURGICAL HISTORY:  History of right inguinal hernia repair     S/P left inguinal hernia repair

## 2025-07-08 NOTE — BRIEF OPERATIVE NOTE - OPERATION/FINDINGS
Patient was prepped and draped. Upon exposure of anal column, excised two hemorrhoids. One was on the left lateral side and one on the right lateral side. Both hemorrhoids with excised with electrocautery and ligasure. Remaining underlying tissue was cauterized for hemostatis and sutured closed. Patient was then wrapped with an internal fibrillar dressing and an external gauze/tape dressing. Patient was then rolled to PACU in stable condition. No complications during the case.  Patient was prepped and draped. Upon exposure of anal column, excised two hemorrhoids. One was on the left lateral side and one on the right lateral side. Both hemorrhoids with excised with electrocautery and ligasure. Remaining underlying tissue was cauterized for hemostatis and sutured closed. Fibrillar was placed in the anal canal followed by and an external gauze/tape dressing. Patient was then rolled to PACU in stable condition. No complications during the case.

## 2025-07-08 NOTE — ASU DISCHARGE PLAN (ADULT/PEDIATRIC) - NURSING INSTRUCTIONS
Next dose of tylenol at/after_0630pm___. Do not exceed 4000mg in a 24hour  period. Every 6hours as needed. You may start taking Advil anytime

## 2025-07-08 NOTE — ASU PATIENT PROFILE, ADULT - FALL HARM RISK - UNIVERSAL INTERVENTIONS
Bed in lowest position, wheels locked, appropriate side rails in place/Call bell, personal items and telephone in reach/Instruct patient to call for assistance before getting out of bed or chair/Non-slip footwear when patient is out of bed/[place] to call system/Physically safe environment - no spills, clutter or unnecessary equipment/Purposeful Proactive Rounding/Room/bathroom lighting operational, light cord in reach

## 2025-07-08 NOTE — ASU DISCHARGE PLAN (ADULT/PEDIATRIC) - FINANCIAL ASSISTANCE
[facility] provides services at a reduced cost to those who are determined to be eligible through [facility]’s financial assistance program. Information regarding [facility]’s financial assistance program can be found by going to [email or web address] or by calling [phone].

## 2025-07-08 NOTE — ASU DISCHARGE PLAN (ADULT/PEDIATRIC) - ASU DC SPECIAL INSTRUCTIONSFT
WOUND CARE: You have two layers of dressings. The outer layer is gauze and tape that can be removed later tonight around bedtime. The inner layer is a fibrillar (cotton-like) dressing that does not need to be removed. It will dissolve when exposed to water such as in the shower.   BATHING: Please do not submerge wound underwater until follow up appointment. You may shower and/or sponge bathe.  ACTIVITY: No heavy lifting anything more than 10-15lbs or straining. Otherwise, you may return to your usual level of physical activity. If you are taking narcotic pain medication (oxycodone) do NOT drive a car, operate machinery or make important decisions.  DIET: Return to your regular diet as tolerated  NOTIFY YOUR SURGEON IF: You have any bleeding that does not stop, any pus draining from your wound, any fever (over 100.4 F) or chills, persistent nausea/vomiting with inability to tolerate food or liquids, persistent diarrhea, or if your pain is not controlled on your discharge pain medications.  FOLLOW-UP:  1. Please call to make a follow-up appointment within one week of discharge   2. Please follow up with your primary care physician in one week regarding your hospitalization.

## 2025-07-09 PROBLEM — E78.5 HYPERLIPIDEMIA, UNSPECIFIED: Chronic | Status: ACTIVE | Noted: 2025-07-02

## 2025-07-09 PROBLEM — H40.9 UNSPECIFIED GLAUCOMA: Chronic | Status: ACTIVE | Noted: 2025-07-02

## 2025-07-15 NOTE — ED ADULT TRIAGE NOTE - BP NONINVASIVE DIASTOLIC (MM HG)
Health Maintenance       COVID-19 Vaccine (4 - 2024-25 season)  Overdue since 9/1/2024    Hepatitis B Vaccine (1 of 3 - 19+ 3-dose series)  Never done    Colorectal Cancer Screening  Never done    Shingles Vaccine (1 of 2)  Never done    Pneumococcal Vaccine 50+ (1 of 1 - PCV)  Never done    Breast Cancer Screening (Every 2 Years)  Scheduled for 7/21/2025            63

## 2025-07-17 LAB — SURGICAL PATHOLOGY STUDY: SIGNIFICANT CHANGE UP

## 2025-07-21 ENCOUNTER — APPOINTMENT (OUTPATIENT)
Dept: SURGERY | Facility: CLINIC | Age: 60
End: 2025-07-21
Payer: COMMERCIAL

## 2025-07-21 VITALS
HEIGHT: 67 IN | SYSTOLIC BLOOD PRESSURE: 122 MMHG | BODY MASS INDEX: 23.54 KG/M2 | HEART RATE: 80 BPM | WEIGHT: 150 LBS | DIASTOLIC BLOOD PRESSURE: 80 MMHG

## 2025-07-21 DIAGNOSIS — Z09 ENCOUNTER FOR FOLLOW-UP EXAMINATION AFTER COMPLETED TREATMENT FOR CONDITIONS OTHER THAN MALIGNANT NEOPLASM: ICD-10-CM

## 2025-07-21 PROCEDURE — 99024 POSTOP FOLLOW-UP VISIT: CPT

## (undated) DEVICE — BIOPSY FORCEP COLD DISP

## (undated) DEVICE — ELCTR ECG CONDUCTIVE ADHESIVE

## (undated) DEVICE — BASIN EMESIS 10IN GRADUATED MAUVE

## (undated) DEVICE — DRAPE TOWEL BLUE 17" X 24"

## (undated) DEVICE — TUBING MEDI-VAC W MAXIGRIP CONNECTORS 1/4"X6'

## (undated) DEVICE — BIOPSY FORCEP RADIAL JAW 4 STANDARD WITH NEEDLE

## (undated) DEVICE — SOL IRR POUR NS 0.9% 500ML

## (undated) DEVICE — PACK MINOR

## (undated) DEVICE — Device

## (undated) DEVICE — POSITIONER FOAM EGG CRATE ULNAR 2PCS (PINK)

## (undated) DEVICE — PREP BETADINE KIT

## (undated) DEVICE — SOL IRR POUR H2O 250ML

## (undated) DEVICE — TUBING SUCTION NONCONDUCTIVE 6MM X 12FT

## (undated) DEVICE — LIGASURE SMALL JAW

## (undated) DEVICE — TUBING IV SET GRAVITY 3Y 100" MACRO

## (undated) DEVICE — WARMING BLANKET UPPER ADULT

## (undated) DEVICE — LUBRICATING JELLY HR ONE SHOT 3G

## (undated) DEVICE — LUBRICATING JELLY ONESHOT 1.25OZ

## (undated) DEVICE — PACK IV START WITH CHG

## (undated) DEVICE — DRSG CURITY GAUZE SPONGE 4 X 4" 12-PLY NON-STERILE

## (undated) DEVICE — KIT ENDO PROCEDURE CUST W/VLV

## (undated) DEVICE — MEDICATION LABELS W MARKER

## (undated) DEVICE — GLV 7 PROTEXIS (BLUE)

## (undated) DEVICE — DRSG COMBINE 5 X 9"

## (undated) DEVICE — GLV 7.5 PROTEXIS (BLUE)

## (undated) DEVICE — ELCTR GROUNDING PAD ADULT COVIDIEN

## (undated) DEVICE — SOLIDIFIER ISOLYZER 2000CC

## (undated) DEVICE — DRAPE THYROID 77" X 123"

## (undated) DEVICE — DRSG BANDAID 0.75X3"

## (undated) DEVICE — VENODYNE/SCD SLEEVE CALF MEDIUM

## (undated) DEVICE — SPECIMEN CONTAINER 100ML

## (undated) DEVICE — SUT POLYSORB 3-0 30" V-20 UNDYED

## (undated) DEVICE — SALIVA EJECTOR (BLUE)

## (undated) DEVICE — CATH IV SAFE BC 22G X 1" (BLUE)

## (undated) DEVICE — DRSG 2X2

## (undated) DEVICE — LAP PAD 18 X 18"

## (undated) DEVICE — GOWN LG

## (undated) DEVICE — DRAPE INSTRUMENT POUCH 6.75" X 11"

## (undated) DEVICE — CONTAINER FORMALIN 10% 20ML